# Patient Record
Sex: FEMALE | Race: WHITE | Employment: OTHER | URBAN - NONMETROPOLITAN AREA
[De-identification: names, ages, dates, MRNs, and addresses within clinical notes are randomized per-mention and may not be internally consistent; named-entity substitution may affect disease eponyms.]

---

## 2017-09-19 ENCOUNTER — HOSPITAL ENCOUNTER (OUTPATIENT)
Dept: NON INVASIVE DIAGNOSTICS | Age: 82
Discharge: HOME | End: 2017-09-19
Payer: MEDICARE

## 2017-09-19 LAB
LV EF: 55 %
LVEF MODALITY: NORMAL

## 2017-09-19 PROCEDURE — 93225 XTRNL ECG REC<48 HRS REC: CPT

## 2017-09-19 PROCEDURE — 93306 TTE W/DOPPLER COMPLETE: CPT

## 2018-06-19 ENCOUNTER — HOSPITAL ENCOUNTER (OUTPATIENT)
Dept: GENERAL RADIOLOGY | Age: 83
Discharge: HOME | End: 2018-06-21
Payer: MEDICARE

## 2018-06-19 ENCOUNTER — HOSPITAL ENCOUNTER (OUTPATIENT)
Age: 83
Discharge: HOME | End: 2018-06-21
Payer: MEDICARE

## 2018-06-19 DIAGNOSIS — M17.0 OSTEOARTHRITIS OF BOTH KNEES, UNSPECIFIED OSTEOARTHRITIS TYPE: ICD-10-CM

## 2018-06-19 PROCEDURE — 73564 X-RAY EXAM KNEE 4 OR MORE: CPT

## 2020-07-17 ENCOUNTER — APPOINTMENT (OUTPATIENT)
Dept: GENERAL RADIOLOGY | Age: 85
End: 2020-07-17
Payer: MEDICARE

## 2020-07-17 ENCOUNTER — HOSPITAL ENCOUNTER (EMERGENCY)
Age: 85
Discharge: HOME | End: 2020-07-17
Payer: MEDICARE

## 2020-07-17 VITALS
WEIGHT: 137 LBS | OXYGEN SATURATION: 97 % | RESPIRATION RATE: 18 BRPM | BODY MASS INDEX: 25.21 KG/M2 | HEART RATE: 66 BPM | HEIGHT: 62 IN | TEMPERATURE: 98.2 F | SYSTOLIC BLOOD PRESSURE: 144 MMHG | DIASTOLIC BLOOD PRESSURE: 64 MMHG

## 2020-07-17 LAB
ABSOLUTE EOS #: 0.36 K/UL (ref 0–0.44)
ABSOLUTE IMMATURE GRANULOCYTE: <0.03 K/UL (ref 0–0.3)
ABSOLUTE LYMPH #: 2.22 K/UL (ref 1.1–3.7)
ABSOLUTE MONO #: 0.59 K/UL (ref 0.1–1.2)
ANION GAP SERPL CALCULATED.3IONS-SCNC: 11 MMOL/L (ref 9–17)
BASOPHILS # BLD: 0 % (ref 0–2)
BASOPHILS ABSOLUTE: 0.03 K/UL (ref 0–0.2)
BNP INTERPRETATION: ABNORMAL
BUN BLDV-MCNC: 28 MG/DL (ref 8–23)
BUN/CREAT BLD: 18 (ref 9–20)
CALCIUM SERPL-MCNC: 8.9 MG/DL (ref 8.6–10.4)
CHLORIDE BLD-SCNC: 109 MMOL/L (ref 98–107)
CO2: 21 MMOL/L (ref 20–31)
CREAT SERPL-MCNC: 1.53 MG/DL (ref 0.5–0.9)
DIFFERENTIAL TYPE: ABNORMAL
EOSINOPHILS RELATIVE PERCENT: 5 % (ref 1–4)
GFR AFRICAN AMERICAN: 39 ML/MIN
GFR NON-AFRICAN AMERICAN: 32 ML/MIN
GFR SERPL CREATININE-BSD FRML MDRD: ABNORMAL ML/MIN/{1.73_M2}
GFR SERPL CREATININE-BSD FRML MDRD: ABNORMAL ML/MIN/{1.73_M2}
GLUCOSE BLD-MCNC: 133 MG/DL (ref 70–99)
HCT VFR BLD CALC: 40.4 % (ref 36.3–47.1)
HEMOGLOBIN: 13.1 G/DL (ref 11.9–15.1)
IMMATURE GRANULOCYTES: 0 %
LYMPHOCYTES # BLD: 28 % (ref 24–43)
MCH RBC QN AUTO: 31.7 PG (ref 25.2–33.5)
MCHC RBC AUTO-ENTMCNC: 32.4 G/DL (ref 28.4–34.8)
MCV RBC AUTO: 97.8 FL (ref 82.6–102.9)
MONOCYTES # BLD: 8 % (ref 3–12)
NRBC AUTOMATED: 0 PER 100 WBC
PDW BLD-RTO: 13.2 % (ref 11.8–14.4)
PLATELET # BLD: 220 K/UL (ref 138–453)
PLATELET ESTIMATE: ABNORMAL
PMV BLD AUTO: 10.8 FL (ref 8.1–13.5)
POTASSIUM SERPL-SCNC: 4.2 MMOL/L (ref 3.7–5.3)
PRO-BNP: 5432 PG/ML
RBC # BLD: 4.13 M/UL (ref 3.95–5.11)
RBC # BLD: ABNORMAL 10*6/UL
SEG NEUTROPHILS: 59 % (ref 36–65)
SEGMENTED NEUTROPHILS ABSOLUTE COUNT: 4.68 K/UL (ref 1.5–8.1)
SODIUM BLD-SCNC: 141 MMOL/L (ref 135–144)
TROPONIN INTERP: ABNORMAL
TROPONIN INTERP: ABNORMAL
TROPONIN T: ABNORMAL NG/ML
TROPONIN T: ABNORMAL NG/ML
TROPONIN, HIGH SENSITIVITY: 19 NG/L (ref 0–14)
TROPONIN, HIGH SENSITIVITY: 21 NG/L (ref 0–14)
WBC # BLD: 7.9 K/UL (ref 3.5–11.3)
WBC # BLD: ABNORMAL 10*3/UL

## 2020-07-17 PROCEDURE — 84484 ASSAY OF TROPONIN QUANT: CPT

## 2020-07-17 PROCEDURE — 80048 BASIC METABOLIC PNL TOTAL CA: CPT

## 2020-07-17 PROCEDURE — 93005 ELECTROCARDIOGRAM TRACING: CPT | Performed by: EMERGENCY MEDICINE

## 2020-07-17 PROCEDURE — 99285 EMERGENCY DEPT VISIT HI MDM: CPT

## 2020-07-17 PROCEDURE — 83880 ASSAY OF NATRIURETIC PEPTIDE: CPT

## 2020-07-17 PROCEDURE — 85025 COMPLETE CBC W/AUTO DIFF WBC: CPT

## 2020-07-17 PROCEDURE — 71045 X-RAY EXAM CHEST 1 VIEW: CPT

## 2020-07-17 PROCEDURE — 36415 COLL VENOUS BLD VENIPUNCTURE: CPT

## 2020-07-17 RX ORDER — FLUTICASONE PROPIONATE 50 MCG
1 SPRAY, SUSPENSION (ML) NASAL 2 TIMES DAILY
COMMUNITY

## 2020-07-17 RX ORDER — FUROSEMIDE 20 MG/1
20 TABLET ORAL 2 TIMES DAILY
Qty: 10 TABLET | Refills: 0 | Status: ON HOLD | OUTPATIENT
Start: 2020-07-17 | End: 2022-05-06

## 2020-07-17 NOTE — ED NOTES
Patient  updated via telephone on patient's care at this time in ED.       Miguel Paz RN  07/17/20 8493

## 2020-07-17 NOTE — ED NOTES
Patient ambulated to bathroom at this time and tolerated well. She verbalized understanding of need to use call light when finished.       Shahnaz Almaraz RN  07/17/20 3448

## 2020-07-17 NOTE — ED PROVIDER NOTES
[facility] ED  EMERGENCY DEPARTMENT ENCOUNTER      Pt Name: Velma Alvarez  MRN: [id number]  [place] 9/3/1928  Date of evaluation: 7/17/2020  Provider: Keyla Valenzuela PA-C    CHIEF COMPLAINT       Chief Complaint   Patient presents with    Shortness of Breath     Onset 0300,        HISTORY OF [place] is a 80 y.o. female who presents to the emergency department from home after being recommended that she come in by her family doctor. She states that 3 AM this morning she woke had to go urinate and experienced 90 second episode of shortness of breath. She states this quickly resolved but today she is felt a little short of breath throughout the day but not exertional and no episodes of chest pain or palpitation no pain or swelling of extremities. Denies fevers chills or cough. She spoke to her family doctor and told him this history and they recommended she come to the ER for evaluation. She is asymptomatic at this time. Triage notes and Nursing notes were reviewed by myself. Any discrepancies are addressed above.     PAST MEDICAL HISTORY     Past Medical History:   Diagnosis Date    Arthritis     Asthma     Atrial fibrillation ([address]     Atrial fibrillation ([address]     CAD (coronary artery disease)     Cerebral artery occlusion with cerebral infarction ([address]     Fibromyalgia     Hyperlipidemia     Hypertension     Thyroid disease        SURGICAL HISTORY       Past Surgical History:   Procedure Laterality Date    APPENDECTOMY      CHOLECYSTECTOMY      HYSTERECTOMY      OTHER SURGICAL HISTORY Left 5/26/15    lumbar OZZIE    TONSILLECTOMY         CURRENT MEDICATIONS       Discharge Medication List as of 7/17/2020  5:11 PM      CONTINUE these medications which have NOT CHANGED    Details   fluticasone (FLONASE) 50 MCG/ACT nasal spray 1 spray by Each Nostril route 2 times dailyHistorical Med      metoprolol (TOPROL-XL) 100 MG XL tablet Take 100 mg by No organomegaly or masses noted. No pulsatile masses noted. Skin free of any obvious rashes or lesions. Extremities without edema. No calf tenderness noted. Distal pulses and sensation intact. Good capillary refill noted. No acute neurologic deficit noted. Good gait and balance. Clear speech. Good affect. Pleasant patient. DIAGNOSTIC RESULTS     EKG:(none if blank)  All [facility]'s [facility] who either signs or Co-signs this chart in the absence of a cardiologist.  EKG Interpretation  Interpreted by emergency department physician  Rhythm: atrial fibrillation - controlled  Rate: 61  Axis: left  Ectopy: premature ventricular contractions (infrequent)  Conduction: normal  ST Segments: nonspecific changes  T Waves: non specific changes  Q Waves: nonspecific    Clinical Impression: non-specific EKG and atrial fibrillation (chronic)  [facility]      RADIOLOGY: (none if blank)   Interpretation per the Radiologistbelow, if available at the time of this note:    XR CHEST PORTABLE   Final Result   Mild pulmonary vascular congestion with slightly prominent cardiac   silhouette. Questionable tiny bilateral pleural effusions.              LABS:  Labs Reviewed   BASIC METABOLIC PANEL - Abnormal; Notable for the following components:       Result Value    Glucose 133 (*)     BUN 28 (*)     CREATININE 1.53 (*)     Chloride 109 (*)     GFR Non- 32 (*)     GFR  39 (*)     All other components within normal limits   BRAIN NATRIURETIC PEPTIDE - Abnormal; Notable for the following components:    Pro-BNP 5,432 (*)     All other components within normal limits   CBC WITH AUTO DIFFERENTIAL - Abnormal; Notable for the following components:    Eosinophils % 5 (*)     All other components within normal limits   TROPONIN - Abnormal; Notable for the following components:    Troponin, High Sensitivity 21 (*)     All other components within normal limits TROPONIN - Abnormal; Notable for the following components:    Troponin, High Sensitivity 19 (*)     All other components within normal limits       All other labs were within normal range or not returned as of this dictation. EMERGENCY DEPARTMENT COURSE andMedical Decision Making:     Vitals:    Vitals:    07/17/20 1546 07/17/20 1601 07/17/20 1616 07/17/20 1631   BP: (!) 150/74 (!) 148/69 (!) 160/66 (!) 144/64   Pulse: 73 70 63 66   Resp:       Temp:       SpO2: 97% 97% 96% 97%   Weight:       Height:           MDM/     Findings consistent with mild congestive heart failure no sign of respiratory difficulty patient is able ambulate back and forth to the bathroom without any difficulty and with no shortness of breath no sign of acute MI. We will place the patient on a short course of diuretics and have her follow with her family physician for recheck. She agrees to return should she develop any chest pain or increasing shortness of breath difficulty breathing or any problems. Strict return precautions and follow up instructions were discussed with the patient with which the patient agrees    ED Medications administered this visit:  Medications - No data to display    CONSULTS: (None if blank)  None    Procedures: (None if blank)       CLINICAL       1.  Acute on chronic congestive heart failure, unspecified heart failure type [facility])          DISPOSITION/PLAN   DISPOSITION Decision To Discharge 07/17/2020 05:10:31 PM      PATIENT REFERRED TO:  Christopher Salvador MD  [address] [place] [address]  [phone]    In 4 days        DISCHARGE MEDICATIONS:  Discharge Medication List as of 7/17/2020  5:11 PM      START taking these medications    Details   furosemide (LASIX) 20 MG tablet Take 1 tablet by mouth 2 times daily for 5 days, Disp-10 tablet,R-0Print                    (Please note that portions of this note were completed with a voice recognition program.  Efforts were made to edit the dictations but occasionallywords are mis-transcribed.)      Zulay Moore II, PA-C (electronically signed)           Zulay Moore II, PA-C  07/17/20 6669

## 2020-07-18 ENCOUNTER — CARE COORDINATION (OUTPATIENT)
Dept: CARE COORDINATION | Age: 85
End: 2020-07-18

## 2020-07-18 LAB
EKG ATRIAL RATE: 44 BPM
EKG Q-T INTERVAL: 396 MS
EKG QRS DURATION: 84 MS
EKG QTC CALCULATION (BAZETT): 398 MS
EKG R AXIS: -42 DEGREES
EKG T AXIS: -51 DEGREES
EKG VENTRICULAR RATE: 61 BPM

## 2020-07-18 PROCEDURE — 93010 ELECTROCARDIOGRAM REPORT: CPT | Performed by: INTERNAL MEDICINE

## 2020-07-18 NOTE — CARE COORDINATION
Patient contacted regarding recent visit for viral symptoms. This [facility] Doc contacted the patient by telephone to perform post discharge call. Verified name and  with patient as identifiers. Provided introduction to self, and reason for call due to viral symptoms of infection and/or exposure to COVID-19. Patient presented to emergency department/flu clinic with complaints of viral symptoms/exposure to COVID. Patient reports symptoms are improving. Due to no new or worsening symptoms the RN CTN/ACM was not notified for escalation. Discussed exposure protocols and quarantine with CDC Guidelines What To Do If You Are Sick    Patient was given an opportunity for questions and concerns. Stay home except to get medical care    Separate yourself from other people and animals in your home    Call ahead before visiting your doctor    Wear a facemask    Cover your coughs and sneezes    Clean your hands often    Avoid sharing personal household items    Clean all high-touch surfaces everyday    Monitor your symptoms  Seek prompt medical attention if your illness is worsening (e.g., difficulty breathing). Before seeking care, call your healthcare provider and tell them that you have, or are being evaluated for, COVID-19. Put on a facemask before you enter the facility. These steps will help the healthcare provider's office to keep other people in the office or waiting room from getting infected or exposed. Ask your healthcare provider to call the local or [facility] health department. Persons who are placed under If you have a medical emergency and need to call 911, notify the dispatch personnel that you have, or are being evaluated for COVID-19. If possible, put on a facemask before emergency medical services arrive. The patient agrees to contact the [facility] exposure line [phone], local health department [place] [facility]: ([phone]) and PCP office for questions related to their healthcare. Author provided contact information for future reference. Patient/family/caregiver given information for [employer] and agrees to enroll no  Patient's preferred e-mail:    Patient's preferred phone number:   Based on Loop alert triggers, patient will be contacted by nurse care manager for worsening symptoms. [place] reports that she is feeling better today. [place] reports that she is not nearly as SOB as she was yesterday. [place] reports that she still does not have a lot of output but did not drink a lot yesterday. [place] denied wanting to speak with a RN. [place] denied loop and reports that she will follow up with PCP Monday. [place] confirmed that her spouse, Susanne Gallegos is her medical decision maker followed by her son, Bonnell Apley who lives with them.

## 2020-07-28 ENCOUNTER — CARE COORDINATION (OUTPATIENT)
Dept: CARE COORDINATION | Age: 85
End: 2020-07-28

## 2020-07-28 NOTE — CARE COORDINATION
Patient contacted regarding COVID-19 risk and screening. This author contacted the patient by telephone to perform follow-up call. Verified name and  with patient as identifiers. Symptoms reviewed with patient. Patient reports symptoms are improving. Due to no new or worsening symptoms the RN CTN/ACM was not notified for escalation. This author reviewed discharge instructions, medical action plan and red flags such as increased shortness of breath, increasing fever, worsening cough or chest pain with patient who verbalized understanding. Discussed exposure protocols and quarantine with CDC Guidelines What To Do If You Are Sick    Patient who was given an opportunity for questions and concerns. The patient agrees to contact the [facility] exposure line [phone], local [facility] department [place] [facility]: ([phone])[facility] PCP office for questions related to their healthcare. Author provided contact information for future reference. [place] reports that she is doing better. Denied chest pain. [place] reports that she did attempt to follow up with Dr. Nicolle Ha however he was on vacation. Agreeable to call Dr. Cheri Phipps office again today.

## 2020-08-01 ENCOUNTER — CARE COORDINATION (OUTPATIENT)
Dept: CARE COORDINATION | Age: 85
End: 2020-08-01

## 2020-08-11 ENCOUNTER — HOSPITAL ENCOUNTER (OUTPATIENT)
Dept: NON INVASIVE DIAGNOSTICS | Age: 85
Discharge: HOME | End: 2020-08-11
Payer: MEDICARE

## 2020-08-11 LAB
LV EF: 55 %
LVEF MODALITY: NORMAL

## 2020-08-11 PROCEDURE — 93306 TTE W/DOPPLER COMPLETE: CPT

## 2021-11-16 ENCOUNTER — HOSPITAL ENCOUNTER (EMERGENCY)
Age: 86
Discharge: HOME | End: 2021-11-16
Payer: MEDICARE

## 2021-11-16 ENCOUNTER — APPOINTMENT (OUTPATIENT)
Dept: GENERAL RADIOLOGY | Age: 86
End: 2021-11-16
Payer: MEDICARE

## 2021-11-16 VITALS
WEIGHT: 135 LBS | BODY MASS INDEX: 25.49 KG/M2 | RESPIRATION RATE: 22 BRPM | SYSTOLIC BLOOD PRESSURE: 177 MMHG | OXYGEN SATURATION: 96 % | HEART RATE: 73 BPM | HEIGHT: 61 IN | TEMPERATURE: 97.4 F | DIASTOLIC BLOOD PRESSURE: 64 MMHG

## 2021-11-16 DIAGNOSIS — R53.1 GENERAL WEAKNESS: Primary | ICD-10-CM

## 2021-11-16 LAB
-: NORMAL
ABSOLUTE EOS #: 0.33 K/UL (ref 0–0.44)
ABSOLUTE IMMATURE GRANULOCYTE: <0.03 K/UL (ref 0–0.3)
ABSOLUTE LYMPH #: 2.13 K/UL (ref 1.1–3.7)
ABSOLUTE MONO #: 0.56 K/UL (ref 0.1–1.2)
AMORPHOUS: NORMAL
ANION GAP SERPL CALCULATED.3IONS-SCNC: 12 MMOL/L (ref 9–17)
BACTERIA: NORMAL
BASOPHILS # BLD: 1 % (ref 0–2)
BASOPHILS ABSOLUTE: 0.03 K/UL (ref 0–0.2)
BILIRUBIN URINE: NEGATIVE
BNP INTERPRETATION: ABNORMAL
BUN BLDV-MCNC: 22 MG/DL (ref 8–23)
BUN/CREAT BLD: 19 (ref 9–20)
CALCIUM SERPL-MCNC: 9.1 MG/DL (ref 8.6–10.4)
CASTS UA: NORMAL /LPF
CHLORIDE BLD-SCNC: 103 MMOL/L (ref 98–107)
CO2: 25 MMOL/L (ref 20–31)
COLOR: YELLOW
COMMENT UA: NORMAL
CREAT SERPL-MCNC: 1.14 MG/DL (ref 0.5–0.9)
CRYSTALS, UA: NORMAL /HPF
DIFFERENTIAL TYPE: ABNORMAL
EOSINOPHILS RELATIVE PERCENT: 5 % (ref 1–4)
EPITHELIAL CELLS UA: NORMAL /HPF (ref 0–25)
GFR AFRICAN AMERICAN: 54 ML/MIN
GFR NON-AFRICAN AMERICAN: 44 ML/MIN
GFR SERPL CREATININE-BSD FRML MDRD: ABNORMAL ML/MIN/{1.73_M2}
GFR SERPL CREATININE-BSD FRML MDRD: ABNORMAL ML/MIN/{1.73_M2}
GLUCOSE BLD-MCNC: 78 MG/DL (ref 70–99)
GLUCOSE URINE: NEGATIVE
HCT VFR BLD CALC: 46 % (ref 36.3–47.1)
HEMOGLOBIN: 15.2 G/DL (ref 11.9–15.1)
IMMATURE GRANULOCYTES: 0 %
KETONES, URINE: NEGATIVE
LEUKOCYTE ESTERASE, URINE: NEGATIVE
LYMPHOCYTES # BLD: 32 % (ref 24–43)
MCH RBC QN AUTO: 30.8 PG (ref 25.2–33.5)
MCHC RBC AUTO-ENTMCNC: 33 G/DL (ref 28.4–34.8)
MCV RBC AUTO: 93.1 FL (ref 82.6–102.9)
MONOCYTES # BLD: 9 % (ref 3–12)
MUCUS: NORMAL
NITRITE, URINE: NEGATIVE
NRBC AUTOMATED: 0 PER 100 WBC
OTHER OBSERVATIONS UA: NORMAL
PDW BLD-RTO: 14.1 % (ref 11.8–14.4)
PH UA: 6 (ref 5–9)
PLATELET # BLD: 219 K/UL (ref 138–453)
PLATELET ESTIMATE: ABNORMAL
PMV BLD AUTO: 10.8 FL (ref 8.1–13.5)
POTASSIUM SERPL-SCNC: 3.8 MMOL/L (ref 3.7–5.3)
PRO-BNP: 2292 PG/ML
PROTEIN UA: NEGATIVE
RBC # BLD: 4.94 M/UL (ref 3.95–5.11)
RBC # BLD: ABNORMAL 10*6/UL
RBC UA: NORMAL /HPF (ref 0–2)
RENAL EPITHELIAL, UA: NORMAL /HPF
SARS-COV-2, RAPID: NOT DETECTED
SEG NEUTROPHILS: 53 % (ref 36–65)
SEGMENTED NEUTROPHILS ABSOLUTE COUNT: 3.52 K/UL (ref 1.5–8.1)
SODIUM BLD-SCNC: 140 MMOL/L (ref 135–144)
SPECIFIC GRAVITY UA: 1.01 (ref 1.01–1.02)
SPECIMEN DESCRIPTION: NORMAL
TRICHOMONAS: NORMAL
TROPONIN INTERP: ABNORMAL
TROPONIN INTERP: ABNORMAL
TROPONIN T: ABNORMAL NG/ML
TROPONIN T: ABNORMAL NG/ML
TROPONIN, HIGH SENSITIVITY: 16 NG/L (ref 0–14)
TROPONIN, HIGH SENSITIVITY: 17 NG/L (ref 0–14)
TURBIDITY: CLEAR
URINE HGB: NEGATIVE
UROBILINOGEN, URINE: NORMAL
WBC # BLD: 6.6 K/UL (ref 3.5–11.3)
WBC # BLD: ABNORMAL 10*3/UL
WBC UA: NORMAL /HPF (ref 0–5)
YEAST: NORMAL

## 2021-11-16 PROCEDURE — 6360000002 HC RX W HCPCS: Performed by: PHYSICIAN ASSISTANT

## 2021-11-16 PROCEDURE — 84484 ASSAY OF TROPONIN QUANT: CPT

## 2021-11-16 PROCEDURE — 93005 ELECTROCARDIOGRAM TRACING: CPT | Performed by: EMERGENCY MEDICINE

## 2021-11-16 PROCEDURE — 96374 THER/PROPH/DIAG INJ IV PUSH: CPT

## 2021-11-16 PROCEDURE — 99282 EMERGENCY DEPT VISIT SF MDM: CPT

## 2021-11-16 PROCEDURE — C9803 HOPD COVID-19 SPEC COLLECT: HCPCS

## 2021-11-16 PROCEDURE — 80048 BASIC METABOLIC PNL TOTAL CA: CPT

## 2021-11-16 PROCEDURE — 85025 COMPLETE CBC W/AUTO DIFF WBC: CPT

## 2021-11-16 PROCEDURE — 81001 URINALYSIS AUTO W/SCOPE: CPT

## 2021-11-16 PROCEDURE — 87635 SARS-COV-2 COVID-19 AMP PRB: CPT

## 2021-11-16 PROCEDURE — 83880 ASSAY OF NATRIURETIC PEPTIDE: CPT

## 2021-11-16 PROCEDURE — 71045 X-RAY EXAM CHEST 1 VIEW: CPT

## 2021-11-16 RX ORDER — FUROSEMIDE 10 MG/ML
40 INJECTION INTRAMUSCULAR; INTRAVENOUS ONCE
Status: COMPLETED | OUTPATIENT
Start: 2021-11-16 | End: 2021-11-16

## 2021-11-16 RX ADMIN — FUROSEMIDE 40 MG: 10 INJECTION INTRAMUSCULAR; INTRAVENOUS at 12:39

## 2021-11-16 ASSESSMENT — ENCOUNTER SYMPTOMS
EYES NEGATIVE: 1
RESPIRATORY NEGATIVE: 1
GASTROINTESTINAL NEGATIVE: 1

## 2021-11-16 NOTE — ED NOTES
Dr Julia Gtz called at office.  He is with a patient and will call us back     Breana Regalado  11/16/21 0427

## 2021-11-16 NOTE — ED PROVIDER NOTES
[facility] ED  EMERGENCY DEPARTMENT ENCOUNTER      Pt Name: Jose Barrett  MRN: [id number]  [place] 9/3/1928  Date of evaluation: 11/16/2021  Provider: GENEVIEVE Jacobo PA-C    CHIEF COMPLAINT     Chief Complaint   Patient presents with    Fatigue     Onset 1 week ago    Shortness of Breath     Onset 1 week ago, history of CHF, Asthma. HISTORY OF PRESENT ILLNESS   (Location/Symptom, Timing/Onset, Context/Setting,Quality, Duration, Modifying Factors, Severity)  Note limiting factors. Jose Barrett is a80 y.o. female who presents to the emergency department      80year-old healthy female presented here with a chief complaint of weakness and shortness of breath. Family members are here at bedside and states she had some increased weakness over the weekend. She was seen evaluated by her primary care physician last week. Blood work at that time showed no acute abnormalities. Patient does have history of coronary artery disease, atrial fibrillation and MI in the past.  She denies any chest pain today. She states she has had some increased weakness and shortness of breath. Denies any known exposure to Covid. She has been vaccinated and had her booster. Nursing Notes werereviewed. REVIEW OF SYSTEMS    (2-9 systems for level 4, 10 or more for level 5)     Review of Systems   Constitutional: Negative. HENT: Negative. Eyes: Negative. Respiratory: Negative. Cardiovascular: Positive for leg swelling. Gastrointestinal: Negative. Endocrine: Negative. Genitourinary: Negative. Musculoskeletal: Negative. Neurological: Negative. Psychiatric/Behavioral: Negative. All other systems reviewed and are negative. Except as noted above the remainder of the review of systems was reviewed and negative.        PAST MEDICAL HISTORY     Past Medical History:   Diagnosis Date    Arthritis     Asthma     Atrial fibrillation ([facility] [address]     Atrial fibrillation ([facility] [address]  CAD (coronary artery disease)     Cerebral artery occlusion with cerebral infarction ([facility]     Fibromyalgia     Hyperlipidemia     Hypertension     Thyroid disease          SURGICALHISTORY       Past Surgical History:   Procedure Laterality Date    APPENDECTOMY      CHOLECYSTECTOMY      HYSTERECTOMY      OTHER SURGICAL HISTORY Left 5/26/15    lumbar OZZIE    TONSILLECTOMY           CURRENT MEDICATIONS       Previous Medications    ACETAMINOPHEN (TYLENOL) 500 MG TABLET    Take 500 mg by mouth every 6 hours as needed for Pain    ALBUTEROL (PROVENTIL HFA;VENTOLIN HFA) 108 (90 BASE) MCG/ACT INHALER    Inhale 2 puffs into the lungs every 6 hours as needed for Wheezing    AMLODIPINE (NORVASC) 5 MG TABLET    Take 5 mg by mouth daily    APIXABAN (ELIQUIS) 2.5 MG TABS TABLET    Take by mouth 2 times daily    ATORVASTATIN (LIPITOR) 20 MG TABLET    Take 20 mg by mouth nightly     CETIRIZINE (ZYRTEC) 10 MG TABLET    Take 10 mg by mouth daily    DICLOFENAC SODIUM 1 % GEL    Apply 2 g topically 2 times daily    DOFETILIDE (TIKOSYN) 125 MCG CAPSULE    Take 125 mcg by mouth 2 times daily    DONEPEZIL (ARICEPT) 5 MG TABLET    Take 5 mg by mouth nightly    FAMOTIDINE (PEPCID) 40 MG TABLET    Take 40 mg by mouth daily    FLUTICASONE (FLONASE) 50 MCG/ACT NASAL SPRAY    1 spray by Each Nostril route 2 times daily    FUROSEMIDE (LASIX) 20 MG TABLET    Take 1 tablet by mouth 2 times daily for 5 days    GABAPENTIN (NEURONTIN) 100 MG CAPSULE    Take 200 mg by mouth nightly    LEVOTHYROXINE (SYNTHROID) 112 MCG TABLET    Take 112 mcg by mouth Daily    LOSARTAN (COZAAR) 50 MG TABLET    Take 50 mg by mouth daily    MELATONIN 3 MG TABS TABLET    Take 3 mg by mouth daily    METOPROLOL (TOPROL-XL) 100 MG XL TABLET    Take 100 mg by mouth daily    OMEPRAZOLE (PRILOSEC) 20 MG CAPSULE    Take 40 mg by mouth daily    PREDNISONE (DELTASONE) 20 MG TABLET    Take 20 mg by mouth daily    TRAMADOL (ULTRAM) 50 MG TABLET    Take 50 mg by mouth every 12 hours as needed for Pain         ALLERGIES   Ambien [zolpidem tartrate]    FAMILY HISTORY     No family history on file. SOCIAL HISTORY       Social History     Socioeconomic History    Marital status:      Spouse name: Not on file    Number of children: Not on file    Years of education: Not on file    Highest education level: Not on file   Occupational History    Not on file   Tobacco Use    Smoking status: Never Smoker    Smokeless tobacco: Never Used   Substance and Sexual Activity    Alcohol use: Not Currently    Drug use: Never    Sexual activity: Not on file   Other Topics Concern    Not on file   Social History Narrative    Not on file     Social Determinants of Health     Financial Resource Strain:     Difficulty of Paying Living Expenses: Not on file   Food Insecurity:     Worried About Running Out of Food in the Last Year: Not on file    Jamie of Food in the Last Year: Not on file   Transportation Needs:     Lack of Transportation (Medical): Not on file    Lack of Transportation (Non-Medical):  Not on file   Physical Activity:     Days of Exercise per Week: Not on file    Minutes of Exercise per Session: Not on file   Stress:     Feeling of Stress : Not on file   Social Connections:     Frequency of Communication with Friends and Family: Not on file    Frequency of Social Gatherings with Friends and Family: Not on file    Attends Zoroastrian Services: Not on file    Active Member of [address] [place] [address] or Organizations: Not on file    Attends Club or Organization Meetings: Not on file    Marital Status: Not on file   Intimate Partner Violence:     Fear of Current or Ex-Partner: Not on file    Emotionally Abused: Not on file    Physically Abused: Not on file    Sexually Abused: Not on file   Housing Stability:     Unable to Pay for Housing in the Last Year: Not on file    Number of [place] in the Last Year: Not on file    Unstable Housing in the Last Year: Not emergency physician with the below findings:      Interpretationper the Radiologist below, if available at the time of this note:    XR CHEST PORTABLE   Final Result   No acute process. Stable cardiomegaly               ED BEDSIDE ULTRASOUND:   Performed by ED Physician - none    LABS:  Labs Reviewed   BASIC METABOLIC PANEL - Abnormal; Notable for the following components:       Result Value    CREATININE 1.14 (*)     GFR Non- 44 (*)     GFR  54 (*)     All other components within normal limits   BRAIN NATRIURETIC PEPTIDE - Abnormal; Notable for the following components:    Pro-BNP 2,292 (*)     All other components within normal limits   CBC WITH AUTO DIFFERENTIAL - Abnormal; Notable for the following components:    Hemoglobin 15.2 (*)     Eosinophils % 5 (*)     All other components within normal limits   TROPONIN - Abnormal; Notable for the following components:    Troponin, High Sensitivity 17 (*)     All other components within normal limits   TROPONIN - Abnormal; Notable for the following components:    Troponin, High Sensitivity 16 (*)     All other components within normal limits   COVID-19, RAPID   URINALYSIS WITH MICROSCOPIC       All other labs were within normal range or not returned as of this dictation. EMERGENCY DEPARTMENT COURSE and DIFFERENTIAL DIAGNOSIS/MDM:   Vitals:    Vitals:    11/16/21 1029 11/16/21 1038 11/16/21 1230   BP: (!) 170/67 (!) 190/77 (!) 206/67   Pulse: 56 65 82   Resp: 18 19 20   Temp: 97.4 °F (36.3 °C)     TempSrc: Tympanic     SpO2: 96% 97% 97%   Weight: 135 lb (61.2 kg)     Height: 5' 1\" (1.549 m)           MDM  Number of Diagnoses or Management Options  General weakness  Diagnosis management comments: Patient presented here with a chief complaint of weakness and shortness of breath. She does have a history of atrial fibrillation EKG showed a atrial fib rhythm here today no ectopy no ST elevation or depression.   Patient had a cardiac work-up here today. Showed mildly elevated troponin at 17 initially and repeat at 16. Patient denies any chest pain. BNP is mildly elevated at 2200. Patient was medicated with Lasix and also medicated for her blood pressure she has had several trips to the restroom here. She denies any chest pain or shortness of breath. Remaining blood work is unremarkable. I spoke to her primary care physician Evelin hRodes regarding her care. He agrees she can be discharged home. He is going to recommended start PT therapy at home for her. He states over the last week he has changed her prescription of Lipitor and Crestor. She had excellently started both medications at one time. Both of them were stopped on the 11th of this month. Patient is advised to use her walker at home. Patient can be discharged to home diagnosis of weakness. She will have close follow-up in the next 24 to 48 hours with Dr. Piyush Heredia. Patient and family members made aware of results and agree with plan of care. Patient was able to eat here and had no difficulties. Procedures    FINAL IMPRESSION      1.  General weakness Stable       DISPOSITION/PLAN   DISPOSITION        PATIENT REFERRED TO:  Danile Brady MD  [address] [place] [id number] [phone]    Schedule an appointment as soon as possible for a visit in 2 days  call for follow up appt thurs or fri of this week      DISCHARGE MEDICATIONS:  New Prescriptions    No medications on file              Summation      Patient Course:      ED Medications administered this visit:    Medications   furosemide (LASIX) injection 40 mg (40 mg IntraVENous Given 11/16/21 1239)       New Prescriptions from this visit:    New Prescriptions    No medications on file       Follow-up:  Daniel Brady MD  [address]  [phone]    Schedule an appointment as soon as possible for a visit in 2 days  call for follow up appt thurs or fri of this week        Final Impression:   1.  General weakness Stable              (Please note that portions of this note were completed with a voice recognition program.  Efforts were made to edit the dictations but occasionally words are mis-transcribed.)         Cinthya Roman PA-C  11/16/21 6524

## 2021-11-17 LAB
EKG ATRIAL RATE: 357 BPM
EKG Q-T INTERVAL: 446 MS
EKG QRS DURATION: 90 MS
EKG QTC CALCULATION (BAZETT): 390 MS
EKG R AXIS: -48 DEGREES
EKG T AXIS: 174 DEGREES
EKG VENTRICULAR RATE: 46 BPM

## 2021-11-17 PROCEDURE — 93010 ELECTROCARDIOGRAM REPORT: CPT | Performed by: INTERNAL MEDICINE

## 2022-02-07 ENCOUNTER — APPOINTMENT (OUTPATIENT)
Dept: GENERAL RADIOLOGY | Age: 87
End: 2022-02-07
Payer: MEDICARE

## 2022-02-07 ENCOUNTER — HOSPITAL ENCOUNTER (EMERGENCY)
Age: 87
Discharge: HOME | End: 2022-02-07
Attending: EMERGENCY MEDICINE
Payer: MEDICARE

## 2022-02-07 VITALS
OXYGEN SATURATION: 96 % | RESPIRATION RATE: 22 BRPM | DIASTOLIC BLOOD PRESSURE: 107 MMHG | BODY MASS INDEX: 25.89 KG/M2 | TEMPERATURE: 98.2 F | WEIGHT: 137 LBS | HEART RATE: 82 BPM | SYSTOLIC BLOOD PRESSURE: 165 MMHG

## 2022-02-07 DIAGNOSIS — I50.21 ACUTE SYSTOLIC CONGESTIVE HEART FAILURE (HCC): Primary | ICD-10-CM

## 2022-02-07 LAB
-: ABNORMAL
ABSOLUTE EOS #: 0.33 K/UL (ref 0–0.44)
ABSOLUTE IMMATURE GRANULOCYTE: <0.03 K/UL (ref 0–0.3)
ABSOLUTE LYMPH #: 1.32 K/UL (ref 1.1–3.7)
ABSOLUTE MONO #: 0.49 K/UL (ref 0.1–1.2)
ALBUMIN SERPL-MCNC: 3.8 G/DL (ref 3.5–5.2)
ALBUMIN/GLOBULIN RATIO: 1.1 (ref 1–2.5)
ALP BLD-CCNC: 127 U/L (ref 35–104)
ALT SERPL-CCNC: <5 U/L (ref 5–33)
AMORPHOUS: ABNORMAL
ANION GAP SERPL CALCULATED.3IONS-SCNC: 13 MMOL/L (ref 9–17)
AST SERPL-CCNC: 11 U/L
BACTERIA: ABNORMAL
BASOPHILS # BLD: 0 % (ref 0–2)
BASOPHILS ABSOLUTE: <0.03 K/UL (ref 0–0.2)
BILIRUB SERPL-MCNC: 0.47 MG/DL (ref 0.3–1.2)
BILIRUBIN URINE: NEGATIVE
BNP INTERPRETATION: ABNORMAL
BUN BLDV-MCNC: 17 MG/DL (ref 8–23)
BUN/CREAT BLD: 17 (ref 9–20)
CALCIUM SERPL-MCNC: 9.2 MG/DL (ref 8.6–10.4)
CASTS UA: ABNORMAL /LPF
CASTS UA: ABNORMAL /LPF
CHLORIDE BLD-SCNC: 106 MMOL/L (ref 98–107)
CO2: 19 MMOL/L (ref 20–31)
COLOR: YELLOW
COMMENT UA: ABNORMAL
CREAT SERPL-MCNC: 1.02 MG/DL (ref 0.5–0.9)
CRYSTALS, UA: ABNORMAL /HPF
DIFFERENTIAL TYPE: ABNORMAL
DIRECT EXAM: NORMAL
DIRECT EXAM: NORMAL
EKG ATRIAL RATE: 258 BPM
EKG Q-T INTERVAL: 370 MS
EKG QRS DURATION: 90 MS
EKG QTC CALCULATION (BAZETT): 464 MS
EKG R AXIS: -46 DEGREES
EKG T AXIS: 142 DEGREES
EKG VENTRICULAR RATE: 95 BPM
EOSINOPHILS RELATIVE PERCENT: 5 % (ref 1–4)
EPITHELIAL CELLS UA: ABNORMAL /HPF (ref 0–25)
GFR AFRICAN AMERICAN: >60 ML/MIN
GFR NON-AFRICAN AMERICAN: 51 ML/MIN
GFR SERPL CREATININE-BSD FRML MDRD: ABNORMAL ML/MIN/{1.73_M2}
GFR SERPL CREATININE-BSD FRML MDRD: ABNORMAL ML/MIN/{1.73_M2}
GLUCOSE BLD-MCNC: 147 MG/DL (ref 70–99)
GLUCOSE URINE: NEGATIVE
HCT VFR BLD CALC: 40.5 % (ref 36.3–47.1)
HEMOGLOBIN: 13.5 G/DL (ref 11.9–15.1)
IMMATURE GRANULOCYTES: 0 %
KETONES, URINE: NEGATIVE
LACTIC ACID, SEPSIS WHOLE BLOOD: NORMAL MMOL/L (ref 0.5–1.9)
LACTIC ACID, SEPSIS: 1.6 MMOL/L (ref 0.5–1.9)
LEUKOCYTE ESTERASE, URINE: NEGATIVE
LYMPHOCYTES # BLD: 18 % (ref 24–43)
Lab: NORMAL
Lab: NORMAL
MCH RBC QN AUTO: 31 PG (ref 25.2–33.5)
MCHC RBC AUTO-ENTMCNC: 33.3 G/DL (ref 28.4–34.8)
MCV RBC AUTO: 93.1 FL (ref 82.6–102.9)
MONOCYTES # BLD: 7 % (ref 3–12)
MUCUS: ABNORMAL
NITRITE, URINE: NEGATIVE
NRBC AUTOMATED: 0 PER 100 WBC
OTHER OBSERVATIONS UA: ABNORMAL
PDW BLD-RTO: 14.1 % (ref 11.8–14.4)
PH UA: 6 (ref 5–9)
PLATELET # BLD: 248 K/UL (ref 138–453)
PLATELET ESTIMATE: ABNORMAL
PMV BLD AUTO: 10.7 FL (ref 8.1–13.5)
POTASSIUM SERPL-SCNC: 3.6 MMOL/L (ref 3.7–5.3)
PRO-BNP: 4366 PG/ML
PROTEIN UA: ABNORMAL
RBC # BLD: 4.35 M/UL (ref 3.95–5.11)
RBC # BLD: ABNORMAL 10*6/UL
RBC UA: ABNORMAL /HPF (ref 0–2)
RENAL EPITHELIAL, UA: ABNORMAL /HPF
SARS-COV-2, RAPID: NOT DETECTED
SEG NEUTROPHILS: 70 % (ref 36–65)
SEGMENTED NEUTROPHILS ABSOLUTE COUNT: 5.08 K/UL (ref 1.5–8.1)
SODIUM BLD-SCNC: 138 MMOL/L (ref 135–144)
SPECIFIC GRAVITY UA: 1.01 (ref 1.01–1.02)
SPECIMEN DESCRIPTION: NORMAL
TOTAL PROTEIN: 7.2 G/DL (ref 6.4–8.3)
TRICHOMONAS: ABNORMAL
TROPONIN INTERP: ABNORMAL
TROPONIN INTERP: ABNORMAL
TROPONIN T: ABNORMAL NG/ML
TROPONIN T: ABNORMAL NG/ML
TROPONIN, HIGH SENSITIVITY: 17 NG/L (ref 0–14)
TROPONIN, HIGH SENSITIVITY: 19 NG/L (ref 0–14)
TSH SERPL DL<=0.05 MIU/L-ACNC: 1.34 MIU/L (ref 0.3–5)
TURBIDITY: CLEAR
URINE HGB: ABNORMAL
UROBILINOGEN, URINE: NORMAL
WBC # BLD: 7.3 K/UL (ref 3.5–11.3)
WBC # BLD: ABNORMAL 10*3/UL
WBC UA: ABNORMAL /HPF (ref 0–5)
YEAST: ABNORMAL

## 2022-02-07 PROCEDURE — 99284 EMERGENCY DEPT VISIT MOD MDM: CPT

## 2022-02-07 PROCEDURE — 80053 COMPREHEN METABOLIC PANEL: CPT

## 2022-02-07 PROCEDURE — 83880 ASSAY OF NATRIURETIC PEPTIDE: CPT

## 2022-02-07 PROCEDURE — 81001 URINALYSIS AUTO W/SCOPE: CPT

## 2022-02-07 PROCEDURE — 87040 BLOOD CULTURE FOR BACTERIA: CPT

## 2022-02-07 PROCEDURE — 93005 ELECTROCARDIOGRAM TRACING: CPT | Performed by: EMERGENCY MEDICINE

## 2022-02-07 PROCEDURE — 83605 ASSAY OF LACTIC ACID: CPT

## 2022-02-07 PROCEDURE — 36415 COLL VENOUS BLD VENIPUNCTURE: CPT

## 2022-02-07 PROCEDURE — 93010 ELECTROCARDIOGRAM REPORT: CPT | Performed by: INTERNAL MEDICINE

## 2022-02-07 PROCEDURE — 84484 ASSAY OF TROPONIN QUANT: CPT

## 2022-02-07 PROCEDURE — 96374 THER/PROPH/DIAG INJ IV PUSH: CPT

## 2022-02-07 PROCEDURE — 71045 X-RAY EXAM CHEST 1 VIEW: CPT

## 2022-02-07 PROCEDURE — 6370000000 HC RX 637 (ALT 250 FOR IP)

## 2022-02-07 PROCEDURE — 87635 SARS-COV-2 COVID-19 AMP PRB: CPT

## 2022-02-07 PROCEDURE — 85025 COMPLETE CBC W/AUTO DIFF WBC: CPT

## 2022-02-07 PROCEDURE — 87880 STREP A ASSAY W/OPTIC: CPT

## 2022-02-07 PROCEDURE — 6360000002 HC RX W HCPCS: Performed by: NURSE PRACTITIONER

## 2022-02-07 PROCEDURE — 87804 INFLUENZA ASSAY W/OPTIC: CPT

## 2022-02-07 PROCEDURE — C9803 HOPD COVID-19 SPEC COLLECT: HCPCS

## 2022-02-07 PROCEDURE — 84443 ASSAY THYROID STIM HORMONE: CPT

## 2022-02-07 RX ORDER — POTASSIUM CHLORIDE 20 MEQ/1
TABLET, EXTENDED RELEASE ORAL
Status: COMPLETED
Start: 2022-02-07 | End: 2022-02-07

## 2022-02-07 RX ORDER — SERTRALINE HYDROCHLORIDE 25 MG/1
25 TABLET, FILM COATED ORAL DAILY
COMMUNITY

## 2022-02-07 RX ORDER — POTASSIUM CHLORIDE 20 MEQ/1
20 TABLET, EXTENDED RELEASE ORAL 2 TIMES DAILY
Status: DISCONTINUED | OUTPATIENT
Start: 2022-02-07 | End: 2022-02-07 | Stop reason: HOSPADM

## 2022-02-07 RX ORDER — FUROSEMIDE 10 MG/ML
20 INJECTION INTRAMUSCULAR; INTRAVENOUS ONCE
Status: COMPLETED | OUTPATIENT
Start: 2022-02-07 | End: 2022-02-07

## 2022-02-07 RX ADMIN — POTASSIUM CHLORIDE 20 MEQ: 20 TABLET, EXTENDED RELEASE ORAL at 15:58

## 2022-02-07 RX ADMIN — POTASSIUM CHLORIDE 20 MEQ: 1500 TABLET, EXTENDED RELEASE ORAL at 15:58

## 2022-02-07 RX ADMIN — FUROSEMIDE 20 MG: 10 INJECTION, SOLUTION INTRAVENOUS at 15:56

## 2022-02-07 NOTE — ED PROVIDER NOTES
[facility] ED  EMERGENCY DEPARTMENT ENCOUNTER      Pt Name: Maria Ines Salazar  MRN: [id number]  [place] 9/3/1928  Date of evaluation: 2/7/2022  Provider: SHELLIE Wallace [id number]       Chief Complaint   Patient presents with    Fever     102 this am    Fatigue     increasing over the last 2-3 weeks    Other     Patient states she noticed a lump near her collarbone that was not previously there, onset Saturday    Chest Pain     Patinet reports 15 minute episode of chest pain that occured Friday          HISTORY OF PRESENT ILLNESS   (Location/Symptom, Timing/Onset, Context/Setting, Quality, Duration, Modifying Factors, Severity)  Note limiting factors. Maria Ines Salazar is a 80 y.o. female with past medical history significant for asthma, A. fib, coronary artery disease with MI, cerebral artery occlusion with infarction, fibromyalgia, hyperlipidemia, hypertension, thyroid disease presents to the emergency department with her son with complaints of 3-week history of fatigue and decreased energy and appetite. 3 to 4-day history of sore throat, mild cough, mild dyspnea. Patient notes brief episode of chest pain 3 days ago. She notes no difficulty swallowing. No current chest pain. No abdominal pain, vomiting, diarrhea or dysuria. HPI    Nursing Notes were reviewed. REVIEW OF SYSTEMS    (2-9 systems for level 4, 10 or more for level 5)     Review of Systems    Except as noted above the remainder of the review of systems was reviewed and negative.        PAST MEDICAL HISTORY     Past Medical History:   Diagnosis Date    Arthritis     Asthma     Atrial fibrillation ([address]     Atrial fibrillation ([address]     CAD (coronary artery disease)     Cerebral artery occlusion with cerebral infarction ([address]     Fibromyalgia     Hyperlipidemia     Hypertension     Thyroid disease          SURGICAL HISTORY       Past Surgical History:   Procedure Laterality Date    APPENDECTOMY      CHOLECYSTECTOMY      HYSTERECTOMY      OTHER SURGICAL HISTORY Left 5/26/15    lumbar OZZIE    TONSILLECTOMY           CURRENT MEDICATIONS       Discharge Medication List as of 2/7/2022  6:13 PM      CONTINUE these medications which have NOT CHANGED    Details   sertraline (ZOLOFT) 25 MG tablet Take 25 mg by mouth dailyHistorical Med      fluticasone (FLONASE) 50 MCG/ACT nasal spray 1 spray by Each Nostril route 2 times dailyHistorical Med      furosemide (LASIX) 20 MG tablet Take 1 tablet by mouth 2 times daily for 5 days, Disp-10 tablet, R-0Print      metoprolol (TOPROL-XL) 100 MG XL tablet Take 50 mg by mouth daily Historical Med      amLODIPine (NORVASC) 5 MG tablet Take 5 mg by mouth dailyHistorical Med      donepezil (ARICEPT) 5 MG tablet Take 5 mg by mouth nightlyHistorical Med      apixaban (ELIQUIS) 2.5 MG TABS tablet Take by mouth 2 times dailyHistorical Med      levothyroxine (SYNTHROID) 112 MCG tablet Take 100 mcg by mouth Daily Historical Med      losartan (COZAAR) 50 MG tablet Take 50 mg by mouth dailyHistorical Med      melatonin 3 MG TABS tablet Take 2 mg by mouth daily Historical Med      omeprazole (PRILOSEC) 20 MG capsule Take 40 mg by mouth dailyHistorical Med      famotidine (PEPCID) 40 MG tablet Take 40 mg by mouth dailyHistorical Med      albuterol (PROVENTIL HFA;VENTOLIN HFA) 108 (90 BASE) MCG/ACT inhaler Inhale 2 puffs into the lungs every 6 hours as needed for WheezingHistorical Med      acetaminophen (TYLENOL) 500 MG tablet Take 500 mg by mouth every 6 hours as needed for PainHistorical Med      diclofenac sodium 1 % GEL Apply 2 g topically 2 times daily, Topical, 2 TIMES DAILY, Historical Med             ALLERGIES     Ambien [zolpidem tartrate]    FAMILY HISTORY     History reviewed. No pertinent family history.        SOCIAL HISTORY       Social History     Socioeconomic History    Marital status:      Spouse name: None    Number of children: None    Years of education: None    Highest education level: None   Occupational History    None   Tobacco Use    Smoking status: Never Smoker    Smokeless tobacco: Never Used   Substance and Sexual Activity    Alcohol use: Not Currently    Drug use: Never    Sexual activity: None   Other Topics Concern    None   Social History Narrative    None     Social Determinants of Health     Financial Resource Strain:     Difficulty of Paying Living Expenses: Not on file   Food Insecurity:     Worried About Running Out of Food in the Last Year: Not on file    Jamie of Food in the Last Year: Not on file   Transportation Needs:     Lack of Transportation (Medical): Not on file    Lack of Transportation (Non-Medical):  Not on file   Physical Activity:     Days of Exercise per Week: Not on file    Minutes of Exercise per Session: Not on file   Stress:     Feeling of Stress : Not on file   Social Connections:     Frequency of Communication with Friends and Family: Not on file    Frequency of Social Gatherings with Friends and Family: Not on file    Attends Church Services: Not on file    Active Member of [address] [place] [id number] or Organizations: Not on file    Attends Club or Organization Meetings: Not on file    Marital Status: Not on file   Intimate Partner Violence:     Fear of Current or Ex-Partner: Not on file    Emotionally Abused: Not on file    Physically Abused: Not on file    Sexually Abused: Not on file   Housing Stability:     Unable to Pay for Housing in the Last Year: Not on file    Number of [place] in the Last Year: Not on file    Unstable Housing in the Last Year: Not on file       SCREENINGS                               CIWA Assessment  BP: (!) 165/107  Pulse: 82                 PHYSICAL EXAM    (up to 7 for level 4, 8 or more for level 5)     ED Triage Vitals [02/07/22 1427]   BP Temp Temp Source Pulse Resp SpO2 Height Weight   (!) 143/77 98.2 °F (36.8 °C) Oral 87 16 96 % -- 137 lb (62.1 kg)       Physical Exam     General: Well-developed, well-nourished, in no apparent distress. HEENT exam: Normocephalic, atraumatic. Pupils equal round and reactive to light and external ocular muscles intact. Posterior pharynx noninjected. Oral airway widely patent. No exudate present. Neck exam: Supple no lymphadenopathy, trachea midline. Chest exam: No audible wheezing. Mild increased respiratory effort with a respiratory rate of 22 and O2 sat of 96% on room air. Heart: Normal heart rate and rhythm. No murmur. Abdomen: Soft, nontender, nondistended. Back: No midline tenderness. No CVA tenderness. Extremities: Patient moving all extremities or difficulty. Intact distal pulses and sensation. Neurologic: Alert and oriented x3. Able to make informed decisions. Skin exam: Clean dry and intact. Blood pressure 143/77, temp 98.2 Fahrenheit, heart rate 87 slightly irregular. Respiratory rate 16 and unlabored and patient satting 96% on room air. Exam remarkable for an alertDIAGNOSTIC RESULTS     EKG: All EKG's are interpreted by the Emergency Department Physician who either signs or Co-signs this chart in the absence of a cardiologist.    EKG obtained, reviewed interpreted by myself revealing normal sinus rhythm with a ventricular rate of 95 bpm, QRS duration of 90 ms, corrected QTC 4 and 64 ms. No ST segment elevation or depression identified. RADIOLOGY:   Non-plain film images such as CT, Ultrasound and MRI are read by the radiologist. Plain radiographic images are visualized and preliminarily interpreted by the emergency physician with the below findings:      Interpretation per the Radiologist below, if available at the time of this note:    XR CHEST PORTABLE   Final Result   Stable cardiac silhouette enlargement. No convincing evidence of edema or pneumonia.                ED BEDSIDE ULTRASOUND:   Performed by ED Physician - none    LABS:  Labs Reviewed   URINE RT REFLEX TO CULTURE - Abnormal; Notable for the following components:       Result Value    Urine Hgb TRACE (*)     Protein, UA TRACE (*)     All other components within normal limits   CBC WITH AUTO DIFFERENTIAL - Abnormal; Notable for the following components:    Seg Neutrophils 70 (*)     Lymphocytes 18 (*)     Eosinophils % 5 (*)     All other components within normal limits   COMPREHENSIVE METABOLIC PANEL W/ REFLEX TO MG FOR LOW K - Abnormal; Notable for the following components:    Glucose 147 (*)     CREATININE 1.02 (*)     Potassium 3.6 (*)     CO2 19 (*)     Alkaline Phosphatase 127 (*)     ALT <5 (*)     GFR Non- 51 (*)     All other components within normal limits   TROPONIN - Abnormal; Notable for the following components:    Troponin, High Sensitivity 19 (*)     All other components within normal limits   BRAIN NATRIURETIC PEPTIDE - Abnormal; Notable for the following components:    Pro-BNP 4,366 (*)     All other components within normal limits   TROPONIN - Abnormal; Notable for the following components:    Troponin, High Sensitivity 17 (*)     All other components within normal limits   MICROSCOPIC URINALYSIS - Abnormal; Notable for the following components:    Bacteria, UA TRACE (*)     Mucus, UA TRACE (*)     All other components within normal limits   COVID-19, RAPID   RAPID INFLUENZA A/B ANTIGENS   CULTURE, BLOOD 1   CULTURE, BLOOD 2   STREP SCREEN GROUP A THROAT   LACTATE, SEPSIS   TSH WITH REFLEX       All other labs were within normal range or not returned as of this dictation.     EMERGENCY DEPARTMENT COURSE and DIFFERENTIAL DIAGNOSIS/MDM:   Vitals:    Vitals:    02/07/22 1430 02/07/22 1445 02/07/22 1500 02/07/22 1715   BP: (!) 143/77 129/79 (!) 135/106 (!) 165/107   Pulse:  91 103 82   Resp:  16 19 22   Temp:       TempSrc:       SpO2: 94% 95%  96%   Weight:               MEÑO Bailey is a 80 y.o. female with past medical history significant for asthma, A. fib, coronary artery disease with MI, cerebral artery occlusion with infarction, fibromyalgia, hyperlipidemia, hypertension, thyroid disease presents to the emergency department with her son with complaints of 3-week history of fatigue and decreased energy and appetite. 3 to 4-day history of sore throat, mild cough, mild dyspnea. Patient notes brief episode of chest pain 3 days ago. She notes no difficulty swallowing. No current chest pain. No abdominal pain, vomiting, diarrhea or dysuria. Exam remarkable for alert, interactive and hydrated appearing 80year-old female in no acute distress. She does appear mildly dyspneic on exam.  HEENT unremarkable. Patient has no audible wheezing. Mild increased respiratory effort. Abdomen is benign. She is moving all extremities no difficulty. Blood pressure 143/77, temp 98.2 Fahrenheit, heart rate 87 slightly irregular. Respiratory rate 16 and unlabored and patient satting 96% on room air. CBC unremarkable. ChemistriesRevealed a potassium of 3.6, CO2 of 19, creatinine 1.02, GFR 51.  proBNP 4366. Troponin XIX.,  Subsequent troponin XVII. Alk phos 127. TSH 1.34    Influenza and COVID-19 testing negative. Strep screen was negative. Urinalysis unremarkable. EKG unremarkable. Chest x-ray obtained, reviewed interpreted by myself without focal infiltrate. Patient and patient's son apprised of the imaging and laboratory findings. Patient was given Lasix p.o. My recommendations are that she be discharged home to follow-up closely with PCP for reevaluation early next week. I recommended that they call Monday for an appointment. Return for increased pain, fever, difficulty breathing, vomiting or new or worsening signs or symptoms. REASSESSMENT              CONSULTS:  None    PROCEDURES:  Unless otherwise noted below, none     Procedures      FINAL IMPRESSION      1.  Acute systolic congestive heart failure ([address] Inadequately Controlled         DISPOSITION/PLAN   DISPOSITION Decision To Discharge 02/07/2022 05:52:25 PM      PATIENT REFERRED TO:  Magdaleno Jones MD  [facility] [address] [place] [address] [id number]    In 2 days  for re-evaluation      DISCHARGE MEDICATIONS:  Discharge Medication List as of 2/7/2022  6:13 PM        Controlled Substances Monitoring:     No flowsheet data found.     (Please note that portions of this note were completed with a voice recognition program.  Efforts were made to edit the dictations but occasionally words are mis-transcribed.)    SHELLIE Olmos CNP (electronically signed)  Attending Emergency Physician           SHELLIE Olmos CNP  02/08/22 1112

## 2022-02-12 LAB
CULTURE: NORMAL
CULTURE: NORMAL
Lab: NORMAL
Lab: NORMAL
SPECIMEN DESCRIPTION: NORMAL
SPECIMEN DESCRIPTION: NORMAL

## 2022-03-02 PROBLEM — Z86.73 HISTORY OF STROKE: Status: ACTIVE | Noted: 2022-03-02

## 2022-03-02 PROBLEM — I25.10 CAD (CORONARY ARTERY DISEASE): Status: ACTIVE | Noted: 2022-03-02

## 2022-03-02 PROBLEM — E03.9 HYPOTHYROIDISM: Status: ACTIVE | Noted: 2022-03-02

## 2022-03-02 PROBLEM — E78.5 HYPERLIPIDEMIA: Status: ACTIVE | Noted: 2022-03-02

## 2022-03-02 PROBLEM — I10 HYPERTENSION: Status: ACTIVE | Noted: 2022-03-02

## 2022-03-02 PROBLEM — E78.5 HYPERLIPIDEMIA: Chronic | Status: ACTIVE | Noted: 2022-03-02

## 2022-03-02 PROBLEM — Z86.73 HISTORY OF STROKE: Chronic | Status: ACTIVE | Noted: 2022-03-02

## 2022-03-02 PROBLEM — I10 HYPERTENSION: Chronic | Status: ACTIVE | Noted: 2022-03-02

## 2022-03-02 PROBLEM — E03.9 HYPOTHYROIDISM: Chronic | Status: ACTIVE | Noted: 2022-03-02

## 2022-03-02 PROBLEM — I25.10 CAD (CORONARY ARTERY DISEASE): Chronic | Status: ACTIVE | Noted: 2022-03-02

## 2022-04-16 ENCOUNTER — HOSPITAL ENCOUNTER (EMERGENCY)
Age: 87
Discharge: ANOTHER HOSPITAL | End: 2022-04-16
Attending: EMERGENCY MEDICINE
Payer: MEDICARE

## 2022-04-16 ENCOUNTER — APPOINTMENT (OUTPATIENT)
Dept: GENERAL RADIOLOGY | Age: 87
End: 2022-04-16
Payer: MEDICARE

## 2022-04-16 VITALS
HEART RATE: 129 BPM | TEMPERATURE: 98.2 F | RESPIRATION RATE: 19 BRPM | SYSTOLIC BLOOD PRESSURE: 155 MMHG | DIASTOLIC BLOOD PRESSURE: 92 MMHG | OXYGEN SATURATION: 95 %

## 2022-04-16 DIAGNOSIS — R77.8 ELEVATED TROPONIN: ICD-10-CM

## 2022-04-16 DIAGNOSIS — I48.91 ATRIAL FIBRILLATION WITH RVR (HCC): ICD-10-CM

## 2022-04-16 DIAGNOSIS — I50.9 ACUTE ON CHRONIC CONGESTIVE HEART FAILURE, UNSPECIFIED HEART FAILURE TYPE (HCC): ICD-10-CM

## 2022-04-16 DIAGNOSIS — J96.00 ACUTE RESPIRATORY FAILURE, UNSPECIFIED WHETHER WITH HYPOXIA OR HYPERCAPNIA (HCC): Primary | ICD-10-CM

## 2022-04-16 LAB
ABSOLUTE EOS #: 0.12 K/UL (ref 0–0.44)
ABSOLUTE IMMATURE GRANULOCYTE: 0.04 K/UL (ref 0–0.3)
ABSOLUTE LYMPH #: 1.99 K/UL (ref 1.1–3.7)
ABSOLUTE MONO #: 0.68 K/UL (ref 0.1–1.2)
ANION GAP SERPL CALCULATED.3IONS-SCNC: 15 MMOL/L (ref 9–17)
BASOPHILS # BLD: 0 % (ref 0–2)
BASOPHILS ABSOLUTE: 0.04 K/UL (ref 0–0.2)
BUN BLDV-MCNC: 24 MG/DL (ref 8–23)
BUN/CREAT BLD: 22 (ref 9–20)
CALCIUM SERPL-MCNC: 8.8 MG/DL (ref 8.6–10.4)
CHLORIDE BLD-SCNC: 107 MMOL/L (ref 98–107)
CO2: 24 MMOL/L (ref 20–31)
CREAT SERPL-MCNC: 1.08 MG/DL (ref 0.5–0.9)
EKG ATRIAL RATE: 113 BPM
EKG Q-T INTERVAL: 348 MS
EKG QRS DURATION: 82 MS
EKG QTC CALCULATION (BAZETT): 483 MS
EKG R AXIS: -103 DEGREES
EKG T AXIS: 40 DEGREES
EKG VENTRICULAR RATE: 116 BPM
EOSINOPHILS RELATIVE PERCENT: 1 % (ref 1–4)
GFR AFRICAN AMERICAN: 57 ML/MIN
GFR NON-AFRICAN AMERICAN: 47 ML/MIN
GFR SERPL CREATININE-BSD FRML MDRD: ABNORMAL ML/MIN/{1.73_M2}
GFR SERPL CREATININE-BSD FRML MDRD: ABNORMAL ML/MIN/{1.73_M2}
GLUCOSE BLD-MCNC: 111 MG/DL (ref 70–99)
HCO3 VENOUS: 22.3 MMOL/L (ref 24–30)
HCT VFR BLD CALC: 44.1 % (ref 36.3–47.1)
HEMOGLOBIN: 14 G/DL (ref 11.9–15.1)
IMMATURE GRANULOCYTES: 0 %
LYMPHOCYTES # BLD: 18 % (ref 24–43)
MCH RBC QN AUTO: 30.4 PG (ref 25.2–33.5)
MCHC RBC AUTO-ENTMCNC: 31.7 G/DL (ref 28.4–34.8)
MCV RBC AUTO: 95.7 FL (ref 82.6–102.9)
MONOCYTES # BLD: 6 % (ref 3–12)
NEGATIVE BASE EXCESS, VEN: 1.1 MMOL/L (ref 0–2)
NRBC AUTOMATED: 0 PER 100 WBC
O2 DEVICE/FLOW/%: ABNORMAL
O2 SAT, VEN: 79.8 % (ref 60–85)
PATIENT TEMP: 37
PCO2, VEN: 33.8 (ref 39–55)
PDW BLD-RTO: 16.7 % (ref 11.8–14.4)
PH VENOUS: 7.44 (ref 7.32–7.42)
PLATELET # BLD: 202 K/UL (ref 138–453)
PMV BLD AUTO: 11.3 FL (ref 8.1–13.5)
PO2, VEN: 41.9 (ref 30–50)
POTASSIUM SERPL-SCNC: 3.5 MMOL/L (ref 3.7–5.3)
PRO-BNP: ABNORMAL PG/ML
RBC # BLD: 4.61 M/UL (ref 3.95–5.11)
RESPIRATORY RATE: 20
SARS-COV-2, RAPID: NOT DETECTED
SEG NEUTROPHILS: 75 % (ref 36–65)
SEGMENTED NEUTROPHILS ABSOLUTE COUNT: 8.15 K/UL (ref 1.5–8.1)
SODIUM BLD-SCNC: 146 MMOL/L (ref 135–144)
SPECIMEN DESCRIPTION: NORMAL
TROPONIN, HIGH SENSITIVITY: 295 NG/L (ref 0–14)
TROPONIN, HIGH SENSITIVITY: 371 NG/L (ref 0–14)
TSH SERPL DL<=0.05 MIU/L-ACNC: 1.96 UIU/ML (ref 0.3–5)
WBC # BLD: 11 K/UL (ref 3.5–11.3)

## 2022-04-16 PROCEDURE — 93005 ELECTROCARDIOGRAM TRACING: CPT | Performed by: EMERGENCY MEDICINE

## 2022-04-16 PROCEDURE — 96375 TX/PRO/DX INJ NEW DRUG ADDON: CPT

## 2022-04-16 PROCEDURE — 2500000003 HC RX 250 WO HCPCS: Performed by: EMERGENCY MEDICINE

## 2022-04-16 PROCEDURE — 84484 ASSAY OF TROPONIN QUANT: CPT

## 2022-04-16 PROCEDURE — 87635 SARS-COV-2 COVID-19 AMP PRB: CPT

## 2022-04-16 PROCEDURE — 80048 BASIC METABOLIC PNL TOTAL CA: CPT

## 2022-04-16 PROCEDURE — C9803 HOPD COVID-19 SPEC COLLECT: HCPCS

## 2022-04-16 PROCEDURE — 85025 COMPLETE CBC W/AUTO DIFF WBC: CPT

## 2022-04-16 PROCEDURE — 93010 ELECTROCARDIOGRAM REPORT: CPT | Performed by: INTERNAL MEDICINE

## 2022-04-16 PROCEDURE — 84443 ASSAY THYROID STIM HORMONE: CPT

## 2022-04-16 PROCEDURE — 36415 COLL VENOUS BLD VENIPUNCTURE: CPT

## 2022-04-16 PROCEDURE — 6360000002 HC RX W HCPCS: Performed by: EMERGENCY MEDICINE

## 2022-04-16 PROCEDURE — 94660 CPAP INITIATION&MGMT: CPT

## 2022-04-16 PROCEDURE — 71045 X-RAY EXAM CHEST 1 VIEW: CPT

## 2022-04-16 PROCEDURE — 96374 THER/PROPH/DIAG INJ IV PUSH: CPT

## 2022-04-16 PROCEDURE — 82805 BLOOD GASES W/O2 SATURATION: CPT

## 2022-04-16 PROCEDURE — 83880 ASSAY OF NATRIURETIC PEPTIDE: CPT

## 2022-04-16 PROCEDURE — 99284 EMERGENCY DEPT VISIT MOD MDM: CPT

## 2022-04-16 RX ORDER — METHYLPREDNISOLONE SODIUM SUCCINATE 125 MG/2ML
60 INJECTION, POWDER, LYOPHILIZED, FOR SOLUTION INTRAMUSCULAR; INTRAVENOUS ONCE
Status: COMPLETED | OUTPATIENT
Start: 2022-04-16 | End: 2022-04-16

## 2022-04-16 RX ORDER — FUROSEMIDE 10 MG/ML
20 INJECTION INTRAMUSCULAR; INTRAVENOUS ONCE
Status: COMPLETED | OUTPATIENT
Start: 2022-04-16 | End: 2022-04-16

## 2022-04-16 RX ORDER — DILTIAZEM HYDROCHLORIDE 5 MG/ML
10 INJECTION INTRAVENOUS ONCE
Status: COMPLETED | OUTPATIENT
Start: 2022-04-16 | End: 2022-04-16

## 2022-04-16 RX ADMIN — METHYLPREDNISOLONE SODIUM SUCCINATE 60 MG: 125 INJECTION, POWDER, FOR SOLUTION INTRAMUSCULAR; INTRAVENOUS at 09:43

## 2022-04-16 RX ADMIN — DILTIAZEM HYDROCHLORIDE 10 MG: 5 INJECTION INTRAVENOUS at 09:45

## 2022-04-16 RX ADMIN — FUROSEMIDE 20 MG: 10 INJECTION, SOLUTION INTRAVENOUS at 11:29

## 2022-04-16 ASSESSMENT — PAIN DESCRIPTION - LOCATION: LOCATION: CHEST

## 2022-04-16 ASSESSMENT — PAIN SCALES - GENERAL: PAINLEVEL_OUTOF10: 1

## 2022-04-16 NOTE — ED NOTES
Contacted radiology requesting them to send images to Cedric Bertrand.        Karthikeyan Jama  04/16/22 7991

## 2022-04-16 NOTE — ED NOTES
Critical trop of 295 reported to  and Rand Barker RN at this time face to face     Janes Zazueta RN  04/16/22 5635

## 2022-04-16 NOTE — ED NOTES
[facility] called back directly accepting the patient. We contacted [id number] them. Patient is waiting on bed placement.       John Hurtado  04/16/22 1045

## 2022-04-16 NOTE — ED NOTES
The Intensivist is in a surgery, will return Dr. Rafal Booker when finished.       Cathy Hendrickson  04/16/22 1035

## 2022-04-16 NOTE — ED NOTES
[address] for Dr. Maria Alejandra Valdez to speak to the [address] [place], [place] [address]e at [facility].       Olaf Pollard  04/16/22 3320 How Severe Is This Condition?: mild

## 2022-04-16 NOTE — ED NOTES
Report given to [facility] ICU.       Marshall Hill RN  04/16/22 5049

## 2022-04-16 NOTE — ED NOTES
[facility] called back with bed assignment. We contacted [employer] and they are setting up transportation.       Karthikeyan Jama  04/16/22 1129

## 2022-04-16 NOTE — ED PROVIDER NOTES
[facility]  EMERGENCY DEPARTMENT ENCOUNTER      Pt Name: Charlie Aguilar  MRN: [id number]  [place] 9/3/1928  Date of evaluation: 4/16/2022  Provider: Nancy Del Real MD    CHIEF COMPLAINT       Chief Complaint   Patient presents with    Chest Pain     chest pain with sob beginning last night around midnight to center chest. states feels better this morning.  Shortness of Breath         HISTORY OF PRESENT ILLNESS   (Location/Symptom, Timing/Onset, Context/Setting, Quality, Duration, Modifying Factors, Severity)  Note limiting factors. Charlie Aguilar is a 80 y.o. female who presents to the emergency department        81 yo female sent from [facility] for SOB. Symptoms started around 12:30 am.  Patient denied chest pain, just felt heaviness due to dyspnea. No fever or cough. Recent diagnosis of pneumonia. Nursing Notes were reviewed. REVIEW OF SYSTEMS    (2-9 systems for level 4, 10 or more for level 5)     Review of Systems   All other systems reviewed and are negative. Except as noted above the remainder of the review of systems was reviewed and negative.        PAST MEDICAL HISTORY     Past Medical History:   Diagnosis Date    Arthritis     Asthma     Atrial fibrillation ([address]     CAD (coronary artery disease)     Fibromyalgia     History of stroke     Hyperlipidemia     Hypertension     Hypothyroidism          SURGICAL HISTORY       Past Surgical History:   Procedure Laterality Date    APPENDECTOMY      CHOLECYSTECTOMY      HYSTERECTOMY      OTHER SURGICAL HISTORY Left 5/26/15    lumbar OZZIE    TONSILLECTOMY           CURRENT MEDICATIONS       Discharge Medication List as of 4/16/2022  2:49 PM      CONTINUE these medications which have NOT CHANGED    Details   sertraline (ZOLOFT) 25 MG tablet Take 25 mg by mouth dailyHistorical Med      fluticasone (FLONASE) 50 MCG/ACT nasal spray 1 spray by Each Nostril route 2 times dailyHistorical Med      furosemide (LASIX) 20 MG tablet Take 1 tablet by mouth 2 times daily for 5 days, Disp-10 tablet, R-0Print      metoprolol (TOPROL-XL) 100 MG XL tablet Take 50 mg by mouth daily Historical Med      amLODIPine (NORVASC) 5 MG tablet Take 5 mg by mouth dailyHistorical Med      donepezil (ARICEPT) 5 MG tablet Take 5 mg by mouth nightlyHistorical Med      apixaban (ELIQUIS) 2.5 MG TABS tablet Take by mouth 2 times dailyHistorical Med      levothyroxine (SYNTHROID) 112 MCG tablet Take 100 mcg by mouth Daily Historical Med      losartan (COZAAR) 50 MG tablet Take 50 mg by mouth dailyHistorical Med      melatonin 3 MG TABS tablet Take 2 mg by mouth daily Historical Med      omeprazole (PRILOSEC) 20 MG capsule Take 40 mg by mouth dailyHistorical Med      famotidine (PEPCID) 40 MG tablet Take 40 mg by mouth dailyHistorical Med      albuterol (PROVENTIL HFA;VENTOLIN HFA) 108 (90 BASE) MCG/ACT inhaler Inhale 2 puffs into the lungs every 6 hours as needed for WheezingHistorical Med      acetaminophen (TYLENOL) 500 MG tablet Take 500 mg by mouth every 6 hours as needed for PainHistorical Med      diclofenac sodium 1 % GEL Apply 2 g topically 2 times daily, Topical, 2 TIMES DAILY, Historical Med             ALLERGIES     Ambien [zolpidem tartrate], Amiodarone, Cymbalta [duloxetine hcl], Griseofulvin ultramicrosize [griseofulvin], and Lyrica [pregabalin]    FAMILY HISTORY     No family history on file.        SOCIAL HISTORY       Social History     Socioeconomic History    Marital status:      Spouse name: Not on file    Number of children: Not on file    Years of education: Not on file    Highest education level: Not on file   Occupational History    Not on file   Tobacco Use    Smoking status: Never Smoker    Smokeless tobacco: Never Used   Substance and Sexual Activity    Alcohol use: Not Currently    Drug use: Never    Sexual activity: Not on file   Other Topics Concern    Not on file   Social History Narrative    Not on file Social Determinants of Health     Financial Resource Strain:     Difficulty of Paying Living Expenses: Not on file   Food Insecurity:     Worried About Running Out of Food in the Last Year: Not on file    Jamie of Food in the Last Year: Not on file   Transportation Needs:     Lack of Transportation (Medical): Not on file    Lack of Transportation (Non-Medical): Not on file   Physical Activity:     Days of Exercise per Week: Not on file    Minutes of Exercise per Session: Not on file   Stress:     Feeling of Stress : Not on file   Social Connections:     Frequency of Communication with Friends and Family: Not on file    Frequency of Social Gatherings with Friends and Family: Not on file    Attends Baptism Services: Not on file    Active Member of [address] [place] [address] [employer] or Organizations: Not on file    Attends Club or Organization Meetings: Not on file    Marital Status: Not on file   Intimate Partner Violence:     Fear of Current or Ex-Partner: Not on file    Emotionally Abused: Not on file    Physically Abused: Not on file    Sexually Abused: Not on file   Housing Stability:     Unable to Pay for Housing in the Last Year: Not on file    Number of [place] in the Last Year: Not on file    Unstable Housing in the Last Year: Not on file       SCREENINGS        Breann Coma Scale  Eye Opening: Spontaneous  Best Verbal Response: Oriented  Best Motor Response: Obeys commands  Colby Coma Scale Score: 15               PHYSICAL EXAM    (up to 7 for level 4, 8 or more for level 5)     ED Triage Vitals [04/16/22 0833]   BP Temp Temp Source Pulse Resp SpO2 Height Weight   (!) 151/83 98.2 °F (36.8 °C) Tympanic 113 24 95 % -- --       Physical Exam  Vitals and nursing note reviewed. Constitutional:       Appearance: She is not ill-appearing or toxic-appearing.       Comments: Tachypnea, labored respirations, O2 sat in 80's despite nasal cannula  moderate acute respiratory distress   HENT:      Head: Normocephalic. Cardiovascular:      Comments: atrail fibrillation rate 130's  Pulmonary:      Comments: Moderate distress with labored respirations. Diffuse crackles  Abdominal:      Palpations: Abdomen is soft. Tenderness: There is no abdominal tenderness. There is no guarding. Musculoskeletal:         General: Normal range of motion. Skin:     General: Skin is warm and dry. Neurological:      General: No focal deficit present. Mental Status: She is alert. DIAGNOSTIC RESULTS     EKG: All EKG's are interpreted by the Emergency Department Physician who either signs or Co-signs this chart in the absence of a cardiologist.          RADIOLOGY:   Non-plain film images such as CT, Ultrasound and MRI are read by the radiologist. Plain radiographic images are visualized and preliminarily interpreted by the emergency physician with the below findings:          Interpretation per the Radiologist below, if available at the time of this note:    XR CHEST PORTABLE   Final Result   More pronounced congestion and pulmonary interstitial edema suggestive of CHF.                ED BEDSIDE ULTRASOUND:   Performed by ED Physician - none    LABS:  Labs Reviewed   BASIC METABOLIC PANEL - Abnormal; Notable for the following components:       Result Value    Glucose 111 (*)     BUN 24 (*)     CREATININE 1.08 (*)     Bun/Cre Ratio 22 (*)     Sodium 146 (*)     Potassium 3.5 (*)     GFR Non- 47 (*)     GFR  57 (*)     All other components within normal limits   CBC WITH AUTO DIFFERENTIAL - Abnormal; Notable for the following components:    RDW 16.7 (*)     Seg Neutrophils 75 (*)     Lymphocytes 18 (*)     Segs Absolute 8.15 (*)     All other components within normal limits   TROPONIN - Abnormal; Notable for the following components:    Troponin, High Sensitivity 295 (*)     All other components within normal limits   BRAIN NATRIURETIC PEPTIDE - Abnormal; Notable for the following components:    Pro-BNP 12,481 (*)     All other components within normal limits   BLOOD GAS, VENOUS - Abnormal; Notable for the following components:    pH, Moo 7.437 (*)     pCO2, Moo 33.8 (*)     HCO3, Venous 22.3 (*)     All other components within normal limits   TROPONIN - Abnormal; Notable for the following components:    Troponin, High Sensitivity 371 (*)     All other components within normal limits   COVID-19, RAPID   TSH WITH REFLEX       All other labs were within normal range or not returned as of this dictation. EMERGENCY DEPARTMENT COURSE and DIFFERENTIAL DIAGNOSIS/MDM:   Vitals:    Vitals:    04/16/22 0955 04/16/22 1035 04/16/22 1124 04/16/22 1200   BP:    (!) 155/92   Pulse: 112 96 116 129   Resp: 22 22 27 19   Temp:       TempSrc:       SpO2: 94% 95% 96% 95%             MDM  Number of Diagnoses or Management Options  Acute on chronic congestive heart failure, unspecified heart failure type (HCC)  Acute respiratory failure, unspecified whether with hypoxia or hypercapnia (HCC)  Atrial fibrillation with RVR (HCC)  Elevated troponin  Diagnosis management comments: 80year-old female presented with dyspnea started around midnight last night. On my initial evaluation patient been placed on 2 L nasal cannula via EMS she was still resting with an oxygen saturation in the mid 80s. Patient also had increased work of breathing. A BiPap was initiated. She had recently been treated with for pneumonia. She did not receive breathing treatments. No wheezing was noted on my examination. Atrial fibrillation heart rate ranging 130s. Cardizem was also given. Patient improved significantly with BiPAP. Heart rate now approximately 90 bpm remains in atrial fibrillation. He does currently take Eliquis. Troponin significantly elevated initial troponin was 295. She is not having any chest pain at this time. States he has not had any chest pain with this only shortness of breath.   Discussed treatment options with patient. They would are interested in her being transferred for further cardiac evaluation and treatment. Patient is awake alert and pleasant answering questions appropriately she is oriented and wishes to be transferred for continued management. Family is requesting [facility]. Patient's been there in the past and seeing cardiology there. Discussed with Dr. Rosalina Villa the intensivist at [facility] and the patient was accepted for transfer. Patient continues to rest comfortably on BiPAP and is without acute complaints at this time. MIPS         REASSESSMENT          CRITICAL CARE TIME   Total Critical Care time was 45 minutes, excluding separately reportable procedures. There was a high probability of clinically significant/life threatening deterioration in the patient's condition which required my urgent intervention. CONSULTS:  None    PROCEDURES:  Unless otherwise noted below, none     Procedures        FINAL IMPRESSION      1. Acute respiratory failure, unspecified whether with hypoxia or hypercapnia ([address]    2. Atrial fibrillation with RVR ([facility] [address]    3. Acute on chronic congestive heart failure, unspecified heart failure type (HCC)    4. Elevated troponin          DISPOSITION/PLAN   DISPOSITION Decision To Transfer 04/16/2022 10:13:28 AM      PATIENT REFERRED TO:  No follow-up provider specified. DISCHARGE MEDICATIONS:  Discharge Medication List as of 4/16/2022  2:49 PM        Controlled Substances Monitoring:     No flowsheet data found.     (Please note that portions of this note were completed with a voice recognition program.  Efforts were made to edit the dictations but occasionally words are mis-transcribed.)    Madonna Montanez MD (electronically signed)  Attending Emergency Physician             Madonna Montanez MD  04/17/22 8824

## 2022-05-05 ENCOUNTER — APPOINTMENT (OUTPATIENT)
Dept: NON INVASIVE DIAGNOSTICS | Age: 87
DRG: 280 | End: 2022-05-05
Payer: MEDICARE

## 2022-05-05 ENCOUNTER — APPOINTMENT (OUTPATIENT)
Dept: GENERAL RADIOLOGY | Age: 87
DRG: 280 | End: 2022-05-05
Payer: MEDICARE

## 2022-05-05 ENCOUNTER — HOSPITAL ENCOUNTER (INPATIENT)
Age: 87
LOS: 2 days | Discharge: NURSING, REHAB OR LONG-TERM CARE | DRG: 280 | End: 2022-05-07
Attending: EMERGENCY MEDICINE | Admitting: INTERNAL MEDICINE
Payer: MEDICARE

## 2022-05-05 DIAGNOSIS — Z51.5 HOSPICE CARE: ICD-10-CM

## 2022-05-05 DIAGNOSIS — I21.4 NSTEMI (NON-ST ELEVATED MYOCARDIAL INFARCTION) (HCC): ICD-10-CM

## 2022-05-05 DIAGNOSIS — J96.00 ACUTE RESPIRATORY FAILURE, UNSPECIFIED WHETHER WITH HYPOXIA OR HYPERCAPNIA (HCC): Primary | ICD-10-CM

## 2022-05-05 PROBLEM — I51.89 LEFT VENTRICULAR HYPOKINESIS: Status: ACTIVE | Noted: 2022-05-05

## 2022-05-05 PROBLEM — N18.9 ACUTE KIDNEY INJURY SUPERIMPOSED ON CKD (HCC): Status: ACTIVE | Noted: 2022-05-05

## 2022-05-05 PROBLEM — I50.23 ACUTE ON CHRONIC SYSTOLIC CHF (CONGESTIVE HEART FAILURE), NYHA CLASS 4 (HCC): Status: ACTIVE | Noted: 2022-05-05

## 2022-05-05 PROBLEM — E86.0 DEHYDRATION: Status: ACTIVE | Noted: 2022-05-05

## 2022-05-05 PROBLEM — N17.9 ACUTE KIDNEY INJURY SUPERIMPOSED ON CKD (HCC): Status: ACTIVE | Noted: 2022-05-05

## 2022-05-05 PROBLEM — J96.21 ACUTE ON CHRONIC RESPIRATORY FAILURE WITH HYPOXIA (HCC): Status: ACTIVE | Noted: 2022-05-05

## 2022-05-05 PROBLEM — E87.29 RESPIRATORY ACIDOSIS: Status: ACTIVE | Noted: 2022-05-05

## 2022-05-05 PROBLEM — J96.01 ACUTE RESPIRATORY FAILURE WITH HYPOXIA (HCC): Status: ACTIVE | Noted: 2022-05-05

## 2022-05-05 LAB
-: ABNORMAL
ABSOLUTE EOS #: 0 K/UL (ref 0–0.44)
ABSOLUTE IMMATURE GRANULOCYTE: 0 K/UL (ref 0–0.3)
ABSOLUTE LYMPH #: 4.77 K/UL (ref 1.1–3.7)
ABSOLUTE MONO #: 1.79 K/UL (ref 0.1–1.2)
ALBUMIN SERPL-MCNC: 4 G/DL (ref 3.5–5.2)
ALBUMIN/GLOBULIN RATIO: 1.4 (ref 1–2.5)
ALLEN TEST: ABNORMAL
ALLEN TEST: ABNORMAL
ALP BLD-CCNC: 107 U/L (ref 35–104)
ALT SERPL-CCNC: 7 U/L (ref 5–33)
AMORPHOUS: ABNORMAL
ANION GAP SERPL CALCULATED.3IONS-SCNC: 25 MMOL/L (ref 9–17)
AST SERPL-CCNC: 13 U/L
BACTERIA: ABNORMAL
BASOPHILS # BLD: 0 % (ref 0–2)
BASOPHILS ABSOLUTE: 0 K/UL (ref 0–0.2)
BILIRUB SERPL-MCNC: 1 MG/DL (ref 0.3–1.2)
BILIRUBIN URINE: NEGATIVE
BUN BLDV-MCNC: 27 MG/DL (ref 8–23)
BUN/CREAT BLD: 14 (ref 9–20)
CALCIUM SERPL-MCNC: 8.4 MG/DL (ref 8.6–10.4)
CASTS UA: ABNORMAL /LPF
CHLORIDE BLD-SCNC: 102 MMOL/L (ref 98–107)
CO2: 16 MMOL/L (ref 20–31)
COLOR: YELLOW
CREAT SERPL-MCNC: 1.97 MG/DL (ref 0.5–0.9)
EKG ATRIAL RATE: 125 BPM
EKG ATRIAL RATE: 97 BPM
EKG P AXIS: 69 DEGREES
EKG P-R INTERVAL: 142 MS
EKG Q-T INTERVAL: 328 MS
EKG Q-T INTERVAL: 376 MS
EKG QRS DURATION: 112 MS
EKG QRS DURATION: 152 MS
EKG QTC CALCULATION (BAZETT): 473 MS
EKG QTC CALCULATION (BAZETT): 508 MS
EKG R AXIS: -105 DEGREES
EKG R AXIS: -59 DEGREES
EKG T AXIS: 176 DEGREES
EKG T AXIS: 77 DEGREES
EKG VENTRICULAR RATE: 110 BPM
EKG VENTRICULAR RATE: 125 BPM
EOSINOPHILS RELATIVE PERCENT: 0 % (ref 1–4)
EPITHELIAL CELLS UA: ABNORMAL /HPF (ref 0–25)
FIO2: 35
GFR AFRICAN AMERICAN: 29 ML/MIN
GFR NON-AFRICAN AMERICAN: 24 ML/MIN
GFR SERPL CREATININE-BSD FRML MDRD: ABNORMAL ML/MIN/{1.73_M2}
GFR SERPL CREATININE-BSD FRML MDRD: ABNORMAL ML/MIN/{1.73_M2}
GLUCOSE BLD-MCNC: 260 MG/DL (ref 70–99)
GLUCOSE URINE: NEGATIVE
HCO3 ARTERIAL: 17.4 MMOL/L (ref 22–26)
HCO3 VENOUS: 15.5 MMOL/L (ref 24–30)
HCT VFR BLD CALC: 42.4 % (ref 36.3–47.1)
HEMOGLOBIN: 13.1 G/DL (ref 11.9–15.1)
IMMATURE GRANULOCYTES: 0 %
KETONES, URINE: NEGATIVE
LEUKOCYTE ESTERASE, URINE: NEGATIVE
LV EF: 23 %
LVEF MODALITY: NORMAL
LYMPHOCYTES # BLD: 32 % (ref 24–43)
MAGNESIUM: 2.5 MG/DL (ref 1.6–2.6)
MCH RBC QN AUTO: 30.2 PG (ref 25.2–33.5)
MCHC RBC AUTO-ENTMCNC: 30.9 G/DL (ref 28.4–34.8)
MCV RBC AUTO: 97.7 FL (ref 82.6–102.9)
MONOCYTES # BLD: 12 % (ref 3–12)
MORPHOLOGY: ABNORMAL
NEGATIVE BASE EXCESS, ART: 4.9 MMOL/L (ref 0–2)
NEGATIVE BASE EXCESS, VEN: 12.4 MMOL/L (ref 0–2)
NITRITE, URINE: NEGATIVE
NRBC AUTOMATED: 0 PER 100 WBC
O2 DEVICE/FLOW/%: ABNORMAL
O2 DEVICE/FLOW/%: ABNORMAL
O2 SAT, ARTERIAL: 98.7 % (ref 95–98)
O2 SAT, VEN: 61.2 % (ref 60–85)
PATIENT TEMP: 37
PATIENT TEMP: 37
PCO2 ARTERIAL: 26.2 MMHG (ref 35–45)
PCO2, VEN: 42.6 (ref 39–55)
PDW BLD-RTO: 16.7 % (ref 11.8–14.4)
PH ARTERIAL: 7.44 (ref 7.35–7.45)
PH UA: 6 (ref 5–9)
PH VENOUS: 7.18 (ref 7.32–7.42)
PLATELET # BLD: 226 K/UL (ref 138–453)
PMV BLD AUTO: 11.8 FL (ref 8.1–13.5)
PO2 ARTERIAL: 127.9 MMHG (ref 80–100)
PO2, VEN: 39.2 (ref 30–50)
POTASSIUM SERPL-SCNC: 3.3 MMOL/L (ref 3.7–5.3)
PRO-BNP: ABNORMAL PG/ML
PROTEIN UA: ABNORMAL
PT. POSITION: ABNORMAL
PT. POSITION: ABNORMAL
RBC # BLD: 4.34 M/UL (ref 3.95–5.11)
RBC UA: ABNORMAL /HPF (ref 0–2)
SAMPLE SITE: ABNORMAL
SAMPLE SITE: ABNORMAL
SEG NEUTROPHILS: 56 % (ref 36–65)
SEGMENTED NEUTROPHILS ABSOLUTE COUNT: 8.34 K/UL (ref 1.5–8.1)
SODIUM BLD-SCNC: 143 MMOL/L (ref 135–144)
SPECIFIC GRAVITY UA: >1.03 (ref 1.01–1.02)
TOTAL PROTEIN: 6.8 G/DL (ref 6.4–8.3)
TROPONIN, HIGH SENSITIVITY: 115 NG/L (ref 0–14)
TROPONIN, HIGH SENSITIVITY: 8643 NG/L (ref 0–14)
TURBIDITY: CLEAR
URINE HGB: NEGATIVE
UROBILINOGEN, URINE: NORMAL
WBC # BLD: 14.9 K/UL (ref 3.5–11.3)
WBC UA: ABNORMAL /HPF (ref 0–5)

## 2022-05-05 PROCEDURE — 93005 ELECTROCARDIOGRAM TRACING: CPT | Performed by: EMERGENCY MEDICINE

## 2022-05-05 PROCEDURE — 36415 COLL VENOUS BLD VENIPUNCTURE: CPT

## 2022-05-05 PROCEDURE — 81001 URINALYSIS AUTO W/SCOPE: CPT

## 2022-05-05 PROCEDURE — 83735 ASSAY OF MAGNESIUM: CPT

## 2022-05-05 PROCEDURE — 51702 INSERT TEMP BLADDER CATH: CPT

## 2022-05-05 PROCEDURE — 36600 WITHDRAWAL OF ARTERIAL BLOOD: CPT

## 2022-05-05 PROCEDURE — 87040 BLOOD CULTURE FOR BACTERIA: CPT

## 2022-05-05 PROCEDURE — 6370000000 HC RX 637 (ALT 250 FOR IP): Performed by: FAMILY MEDICINE

## 2022-05-05 PROCEDURE — 6370000000 HC RX 637 (ALT 250 FOR IP): Performed by: INTERNAL MEDICINE

## 2022-05-05 PROCEDURE — 93010 ELECTROCARDIOGRAM REPORT: CPT | Performed by: INTERNAL MEDICINE

## 2022-05-05 PROCEDURE — 80053 COMPREHEN METABOLIC PANEL: CPT

## 2022-05-05 PROCEDURE — 85025 COMPLETE CBC W/AUTO DIFF WBC: CPT

## 2022-05-05 PROCEDURE — 94664 DEMO&/EVAL PT USE INHALER: CPT

## 2022-05-05 PROCEDURE — 6360000002 HC RX W HCPCS: Performed by: EMERGENCY MEDICINE

## 2022-05-05 PROCEDURE — 2500000003 HC RX 250 WO HCPCS: Performed by: NURSE PRACTITIONER

## 2022-05-05 PROCEDURE — 84484 ASSAY OF TROPONIN QUANT: CPT

## 2022-05-05 PROCEDURE — 93005 ELECTROCARDIOGRAM TRACING: CPT | Performed by: FAMILY MEDICINE

## 2022-05-05 PROCEDURE — 2000000000 HC ICU R&B

## 2022-05-05 PROCEDURE — 93306 TTE W/DOPPLER COMPLETE: CPT

## 2022-05-05 PROCEDURE — 71045 X-RAY EXAM CHEST 1 VIEW: CPT

## 2022-05-05 PROCEDURE — 99285 EMERGENCY DEPT VISIT HI MDM: CPT

## 2022-05-05 PROCEDURE — 94660 CPAP INITIATION&MGMT: CPT

## 2022-05-05 PROCEDURE — 82805 BLOOD GASES W/O2 SATURATION: CPT

## 2022-05-05 PROCEDURE — 96365 THER/PROPH/DIAG IV INF INIT: CPT

## 2022-05-05 PROCEDURE — 99223 1ST HOSP IP/OBS HIGH 75: CPT | Performed by: FAMILY MEDICINE

## 2022-05-05 PROCEDURE — 6370000000 HC RX 637 (ALT 250 FOR IP): Performed by: EMERGENCY MEDICINE

## 2022-05-05 PROCEDURE — 96375 TX/PRO/DX INJ NEW DRUG ADDON: CPT

## 2022-05-05 PROCEDURE — 2580000003 HC RX 258: Performed by: EMERGENCY MEDICINE

## 2022-05-05 PROCEDURE — 94640 AIRWAY INHALATION TREATMENT: CPT

## 2022-05-05 PROCEDURE — 83880 ASSAY OF NATRIURETIC PEPTIDE: CPT

## 2022-05-05 RX ORDER — NITROGLYCERIN 20 MG/100ML
5-200 INJECTION INTRAVENOUS CONTINUOUS
Status: DISCONTINUED | OUTPATIENT
Start: 2022-05-05 | End: 2022-05-07 | Stop reason: HOSPADM

## 2022-05-05 RX ORDER — ONDANSETRON 2 MG/ML
4 INJECTION INTRAMUSCULAR; INTRAVENOUS EVERY 6 HOURS PRN
Status: DISCONTINUED | OUTPATIENT
Start: 2022-05-05 | End: 2022-05-07 | Stop reason: HOSPADM

## 2022-05-05 RX ORDER — METOPROLOL TARTRATE 5 MG/5ML
2.5 INJECTION INTRAVENOUS ONCE
Status: DISCONTINUED | OUTPATIENT
Start: 2022-05-05 | End: 2022-05-05

## 2022-05-05 RX ORDER — PANTOPRAZOLE SODIUM 40 MG/1
40 TABLET, DELAYED RELEASE ORAL
Status: DISCONTINUED | OUTPATIENT
Start: 2022-05-06 | End: 2022-05-07 | Stop reason: HOSPADM

## 2022-05-05 RX ORDER — POLYVINYL ALCOHOL, POVIDONE 14; 6 MG/ML; MG/ML
2 SOLUTION/ DROPS OPHTHALMIC 2 TIMES DAILY PRN
COMMUNITY

## 2022-05-05 RX ORDER — DOCUSATE SODIUM 100 MG/1
100 CAPSULE, LIQUID FILLED ORAL 2 TIMES DAILY
COMMUNITY

## 2022-05-05 RX ORDER — UREA 10 %
2 LOTION (ML) TOPICAL NIGHTLY
Status: DISCONTINUED | OUTPATIENT
Start: 2022-05-05 | End: 2022-05-05

## 2022-05-05 RX ORDER — IPRATROPIUM BROMIDE AND ALBUTEROL SULFATE 2.5; .5 MG/3ML; MG/3ML
1 SOLUTION RESPIRATORY (INHALATION) ONCE
Status: COMPLETED | OUTPATIENT
Start: 2022-05-05 | End: 2022-05-05

## 2022-05-05 RX ORDER — AMLODIPINE BESYLATE 5 MG/1
5 TABLET ORAL DAILY
Status: DISCONTINUED | OUTPATIENT
Start: 2022-05-05 | End: 2022-05-07 | Stop reason: HOSPADM

## 2022-05-05 RX ORDER — LOSARTAN POTASSIUM 50 MG/1
50 TABLET ORAL DAILY
Status: DISCONTINUED | OUTPATIENT
Start: 2022-05-05 | End: 2022-05-07 | Stop reason: HOSPADM

## 2022-05-05 RX ORDER — SERTRALINE HYDROCHLORIDE 25 MG/1
25 TABLET, FILM COATED ORAL DAILY
Status: DISCONTINUED | OUTPATIENT
Start: 2022-05-06 | End: 2022-05-07 | Stop reason: HOSPADM

## 2022-05-05 RX ORDER — CLOPIDOGREL BISULFATE 75 MG/1
75 TABLET ORAL DAILY
Status: DISCONTINUED | OUTPATIENT
Start: 2022-05-05 | End: 2022-05-07 | Stop reason: HOSPADM

## 2022-05-05 RX ORDER — ALBUTEROL SULFATE 90 UG/1
2 AEROSOL, METERED RESPIRATORY (INHALATION) 4 TIMES DAILY
Status: DISCONTINUED | OUTPATIENT
Start: 2022-05-05 | End: 2022-05-07 | Stop reason: HOSPADM

## 2022-05-05 RX ORDER — ACETAMINOPHEN 500 MG
500 TABLET ORAL EVERY 6 HOURS PRN
Status: DISCONTINUED | OUTPATIENT
Start: 2022-05-05 | End: 2022-05-05 | Stop reason: DRUGHIGH

## 2022-05-05 RX ORDER — ACETAMINOPHEN 325 MG/1
650 TABLET ORAL EVERY 6 HOURS PRN
Status: ON HOLD | COMMUNITY
End: 2022-05-06 | Stop reason: HOSPADM

## 2022-05-05 RX ORDER — METOPROLOL TARTRATE 5 MG/5ML
2.5 INJECTION INTRAVENOUS EVERY 6 HOURS PRN
Status: DISCONTINUED | OUTPATIENT
Start: 2022-05-05 | End: 2022-05-05

## 2022-05-05 RX ORDER — POLYETHYLENE GLYCOL 3350 17 G/17G
17 POWDER, FOR SOLUTION ORAL 2 TIMES DAILY PRN
COMMUNITY

## 2022-05-05 RX ORDER — ASPIRIN 325 MG
325 TABLET ORAL DAILY
Status: ON HOLD | COMMUNITY
End: 2022-05-06 | Stop reason: HOSPADM

## 2022-05-05 RX ORDER — POLYETHYLENE GLYCOL 3350 17 G/17G
17 POWDER, FOR SOLUTION ORAL DAILY PRN
Status: DISCONTINUED | OUTPATIENT
Start: 2022-05-05 | End: 2022-05-07 | Stop reason: HOSPADM

## 2022-05-05 RX ORDER — LEVOTHYROXINE SODIUM 0.1 MG/1
100 TABLET ORAL DAILY
Status: DISCONTINUED | OUTPATIENT
Start: 2022-05-06 | End: 2022-05-07 | Stop reason: HOSPADM

## 2022-05-05 RX ORDER — SODIUM CHLORIDE 0.9 % (FLUSH) 0.9 %
5-40 SYRINGE (ML) INJECTION EVERY 12 HOURS SCHEDULED
Status: DISCONTINUED | OUTPATIENT
Start: 2022-05-05 | End: 2022-05-07 | Stop reason: HOSPADM

## 2022-05-05 RX ORDER — ONDANSETRON 4 MG/1
4 TABLET, ORALLY DISINTEGRATING ORAL EVERY 8 HOURS PRN
Status: DISCONTINUED | OUTPATIENT
Start: 2022-05-05 | End: 2022-05-07 | Stop reason: HOSPADM

## 2022-05-05 RX ORDER — NITROGLYCERIN 0.4 MG/1
0.4 TABLET SUBLINGUAL EVERY 5 MIN PRN
COMMUNITY

## 2022-05-05 RX ORDER — FUROSEMIDE 10 MG/ML
40 INJECTION INTRAMUSCULAR; INTRAVENOUS 2 TIMES DAILY
Status: DISCONTINUED | OUTPATIENT
Start: 2022-05-05 | End: 2022-05-07 | Stop reason: HOSPADM

## 2022-05-05 RX ORDER — ALBUTEROL SULFATE 90 UG/1
2 AEROSOL, METERED RESPIRATORY (INHALATION) EVERY 6 HOURS PRN
Status: DISCONTINUED | OUTPATIENT
Start: 2022-05-05 | End: 2022-05-07 | Stop reason: HOSPADM

## 2022-05-05 RX ORDER — IPRATROPIUM BROMIDE AND ALBUTEROL SULFATE 2.5; .5 MG/3ML; MG/3ML
1 SOLUTION RESPIRATORY (INHALATION)
Status: DISCONTINUED | OUTPATIENT
Start: 2022-05-05 | End: 2022-05-05

## 2022-05-05 RX ORDER — FAMOTIDINE 20 MG/1
20 TABLET, FILM COATED ORAL DAILY
Status: DISCONTINUED | OUTPATIENT
Start: 2022-05-06 | End: 2022-05-06

## 2022-05-05 RX ORDER — SODIUM CHLORIDE 0.9 % (FLUSH) 0.9 %
5-40 SYRINGE (ML) INJECTION PRN
Status: DISCONTINUED | OUTPATIENT
Start: 2022-05-05 | End: 2022-05-07 | Stop reason: HOSPADM

## 2022-05-05 RX ORDER — ASPIRIN 81 MG/1
162 TABLET ORAL ONCE
Status: COMPLETED | OUTPATIENT
Start: 2022-05-05 | End: 2022-05-05

## 2022-05-05 RX ORDER — CALCIUM CARBONATE 500(1250)
500 TABLET ORAL 2 TIMES DAILY PRN
Status: ON HOLD | COMMUNITY
End: 2022-05-06 | Stop reason: HOSPADM

## 2022-05-05 RX ORDER — VIT A/VIT C/VIT E/ZINC/COPPER 4296-226
1 CAPSULE ORAL DAILY
Status: ON HOLD | COMMUNITY
End: 2022-05-06 | Stop reason: HOSPADM

## 2022-05-05 RX ORDER — FENTANYL CITRATE 50 UG/ML
25 INJECTION, SOLUTION INTRAMUSCULAR; INTRAVENOUS
Status: DISCONTINUED | OUTPATIENT
Start: 2022-05-05 | End: 2022-05-06

## 2022-05-05 RX ORDER — DONEPEZIL HYDROCHLORIDE 5 MG/1
5 TABLET, FILM COATED ORAL NIGHTLY
Status: DISCONTINUED | OUTPATIENT
Start: 2022-05-05 | End: 2022-05-07 | Stop reason: HOSPADM

## 2022-05-05 RX ORDER — SODIUM CHLORIDE 9 MG/ML
INJECTION, SOLUTION INTRAVENOUS PRN
Status: DISCONTINUED | OUTPATIENT
Start: 2022-05-05 | End: 2022-05-07 | Stop reason: HOSPADM

## 2022-05-05 RX ORDER — ACETAMINOPHEN 650 MG/1
650 SUPPOSITORY RECTAL EVERY 6 HOURS PRN
Status: DISCONTINUED | OUTPATIENT
Start: 2022-05-05 | End: 2022-05-07 | Stop reason: HOSPADM

## 2022-05-05 RX ORDER — FLUTICASONE PROPIONATE 50 MCG
1 SPRAY, SUSPENSION (ML) NASAL 2 TIMES DAILY
Status: DISCONTINUED | OUTPATIENT
Start: 2022-05-05 | End: 2022-05-07 | Stop reason: HOSPADM

## 2022-05-05 RX ORDER — METOPROLOL SUCCINATE 50 MG/1
50 TABLET, EXTENDED RELEASE ORAL DAILY
Status: DISCONTINUED | OUTPATIENT
Start: 2022-05-05 | End: 2022-05-07 | Stop reason: HOSPADM

## 2022-05-05 RX ORDER — METHYLPREDNISOLONE SODIUM SUCCINATE 125 MG/2ML
125 INJECTION, POWDER, LYOPHILIZED, FOR SOLUTION INTRAMUSCULAR; INTRAVENOUS ONCE
Status: COMPLETED | OUTPATIENT
Start: 2022-05-05 | End: 2022-05-05

## 2022-05-05 RX ORDER — LANOLIN ALCOHOL/MO/W.PET/CERES
3 CREAM (GRAM) TOPICAL NIGHTLY
Status: DISCONTINUED | OUTPATIENT
Start: 2022-05-05 | End: 2022-05-07 | Stop reason: HOSPADM

## 2022-05-05 RX ORDER — ASPIRIN 81 MG/1
81 TABLET, CHEWABLE ORAL DAILY
Status: DISCONTINUED | OUTPATIENT
Start: 2022-05-06 | End: 2022-05-07 | Stop reason: HOSPADM

## 2022-05-05 RX ORDER — ALBUTEROL SULFATE 90 UG/1
2 AEROSOL, METERED RESPIRATORY (INHALATION) EVERY 6 HOURS PRN
Status: DISCONTINUED | OUTPATIENT
Start: 2022-05-05 | End: 2022-05-05

## 2022-05-05 RX ORDER — METOPROLOL TARTRATE 5 MG/5ML
2.5 INJECTION INTRAVENOUS
Status: DISCONTINUED | OUTPATIENT
Start: 2022-05-05 | End: 2022-05-05

## 2022-05-05 RX ORDER — MONTELUKAST SODIUM 10 MG/1
10 TABLET ORAL DAILY
Status: ON HOLD | COMMUNITY
End: 2022-05-06 | Stop reason: HOSPADM

## 2022-05-05 RX ORDER — FUROSEMIDE 10 MG/ML
20 INJECTION INTRAMUSCULAR; INTRAVENOUS ONCE
Status: COMPLETED | OUTPATIENT
Start: 2022-05-05 | End: 2022-05-05

## 2022-05-05 RX ORDER — ACETAMINOPHEN 325 MG/1
650 TABLET ORAL EVERY 6 HOURS PRN
Status: DISCONTINUED | OUTPATIENT
Start: 2022-05-05 | End: 2022-05-07 | Stop reason: HOSPADM

## 2022-05-05 RX ADMIN — CLOPIDOGREL BISULFATE 75 MG: 75 TABLET ORAL at 18:52

## 2022-05-05 RX ADMIN — ALBUTEROL SULFATE 2 PUFF: 90 AEROSOL, METERED RESPIRATORY (INHALATION) at 20:23

## 2022-05-05 RX ADMIN — APIXABAN 2.5 MG: 2.5 TABLET, FILM COATED ORAL at 18:52

## 2022-05-05 RX ADMIN — DONEPEZIL HYDROCHLORIDE 5 MG: 5 TABLET, FILM COATED ORAL at 20:12

## 2022-05-05 RX ADMIN — FUROSEMIDE 20 MG: 10 INJECTION, SOLUTION INTRAVENOUS at 12:34

## 2022-05-05 RX ADMIN — NITROGLYCERIN 5 MCG/MIN: 20 INJECTION INTRAVENOUS at 17:24

## 2022-05-05 RX ADMIN — METOPROLOL TARTRATE 25 MG: 25 TABLET, FILM COATED ORAL at 19:16

## 2022-05-05 RX ADMIN — MELATONIN 3 MG ORAL TABLET 3 MG: 3 TABLET ORAL at 20:12

## 2022-05-05 RX ADMIN — CEFTRIAXONE SODIUM 1000 MG: 1 INJECTION, POWDER, FOR SOLUTION INTRAMUSCULAR; INTRAVENOUS at 11:11

## 2022-05-05 RX ADMIN — FLUTICASONE PROPIONATE 1 SPRAY: 50 SPRAY, METERED NASAL at 20:12

## 2022-05-05 RX ADMIN — ASPIRIN 162 MG: 81 TABLET, COATED ORAL at 18:52

## 2022-05-05 RX ADMIN — IPRATROPIUM BROMIDE AND ALBUTEROL SULFATE 1 AMPULE: 2.5; .5 SOLUTION RESPIRATORY (INHALATION) at 10:17

## 2022-05-05 RX ADMIN — METHYLPREDNISOLONE SODIUM SUCCINATE 125 MG: 125 INJECTION, POWDER, FOR SOLUTION INTRAMUSCULAR; INTRAVENOUS at 10:11

## 2022-05-05 RX ADMIN — METOPROLOL TARTRATE 25 MG: 25 TABLET, FILM COATED ORAL at 20:16

## 2022-05-05 ASSESSMENT — PAIN DESCRIPTION - ORIENTATION
ORIENTATION: MID

## 2022-05-05 ASSESSMENT — PAIN DESCRIPTION - FREQUENCY
FREQUENCY: CONTINUOUS

## 2022-05-05 ASSESSMENT — PAIN DESCRIPTION - DESCRIPTORS
DESCRIPTORS: TIGHTNESS

## 2022-05-05 ASSESSMENT — PAIN DESCRIPTION - LOCATION
LOCATION: CHEST

## 2022-05-05 ASSESSMENT — PAIN SCALES - GENERAL
PAINLEVEL_OUTOF10: 0
PAINLEVEL_OUTOF10: 1
PAINLEVEL_OUTOF10: 0

## 2022-05-05 NOTE — ED NOTES
Contacted Dr. Maksim Martinez office per Dr. Laruen Moss request. Dr. Maksim Martinez called back during the call with his office to speak to Dr. Estelle Peterson Police  05/05/22 0750

## 2022-05-05 NOTE — ED PROVIDER NOTES
[facility] [place] ED  EMERGENCY DEPARTMENT ENCOUNTER      Pt Name: Nida Mckeon  MRN: [id number]  [place] 9/3/1928  Date of evaluation: 5/5/2022  Provider: Vincent Duque MD    CHIEF COMPLAINT       Chief Complaint   Patient presents with    Respiratory Distress         HISTORY OF PRESENT ILLNESS   (Location/Symptom, Timing/Onset, Context/Setting, Quality, Duration, Modifying Factors, Severity)  Note limiting factors. Nida Mckeon is a 80 y.o. female who presents to the emergency department      80year-old female brought to the emergency department from her F for acute respiratory failure. Is noted by staff to be short of breath this morning. When EMS arrived the patient was alert and was able to speak to them. By the time the patient arrived to the emergency department he was unresponsive. Bag valve respirations in process. Patient did have a pulse. He was pale and cool. DNR CCA paperwork sent by [facility]. Patient specifically wanted no intubation. Nursing Notes were reviewed. REVIEW OF SYSTEMS    (2-9 systems for level 4, 10 or more for level 5)     Review of Systems   Unable to perform ROS: Severe respiratory distress       Except as noted above the remainder of the review of systems was reviewed and negative.        PAST MEDICAL HISTORY     Past Medical History:   Diagnosis Date    Arthritis     Asthma     Atrial fibrillation ([facility] [address]     CAD (coronary artery disease)     Fibromyalgia     History of stroke     Hyperlipidemia     Hypertension     Hypothyroidism     MI (myocardial infarction) ([facility] [address]          SURGICAL HISTORY       Past Surgical History:   Procedure Laterality Date    APPENDECTOMY      CHOLECYSTECTOMY      HYSTERECTOMY      OTHER SURGICAL HISTORY Left 5/26/15    lumbar OZZIE    TONSILLECTOMY           CURRENT MEDICATIONS       Previous Medications    ACETAMINOPHEN (TYLENOL) 325 MG TABLET    Take 650 mg by mouth every 6 hours as needed for Pain or Fever ACETAMINOPHEN (TYLENOL) 500 MG TABLET    Take 500 mg by mouth every 6 hours as needed for Pain    ALBUTEROL (PROVENTIL HFA;VENTOLIN HFA) 108 (90 BASE) MCG/ACT INHALER    Inhale 2 puffs into the lungs every 6 hours as needed for Wheezing    AMLODIPINE (NORVASC) 5 MG TABLET    Take 5 mg by mouth daily    APIXABAN (ELIQUIS) 2.5 MG TABS TABLET    Take by mouth 2 times daily    ASPIRIN 325 MG TABLET    Take 325 mg by mouth daily    CALCIUM CARBONATE (OSCAL) 500 MG TABS TABLET    Take 500 mg by mouth 2 times daily as needed    DICLOFENAC SODIUM 1 % GEL    Apply 2 g topically 2 times daily    DOCUSATE SODIUM (COLACE) 100 MG CAPSULE    Take 100 mg by mouth 2 times daily    DONEPEZIL (ARICEPT) 5 MG TABLET    Take 5 mg by mouth nightly    FAMOTIDINE (PEPCID) 40 MG TABLET    Take 20 mg by mouth daily     FLUTICASONE (FLONASE) 50 MCG/ACT NASAL SPRAY    1 spray by Each Nostril route 2 times daily    FUROSEMIDE (LASIX) 20 MG TABLET    Take 1 tablet by mouth 2 times daily for 5 days    LACTOBACILLUS RHAMNOSUS, GG, PO    Take 1 caplet by mouth daily    LEVOTHYROXINE (SYNTHROID) 112 MCG TABLET    Take 100 mcg by mouth Daily     LOSARTAN (COZAAR) 50 MG TABLET    Take 50 mg by mouth at bedtime     MELATONIN 3 MG TABS TABLET    Take 3 mg by mouth nightly     METOPROLOL (TOPROL-XL) 100 MG XL TABLET    Take 100 mg by mouth 2 times daily     MONTELUKAST (SINGULAIR) 10 MG TABLET    Take 10 mg by mouth daily    MULTIPLE VITAMINS-MINERALS (ICAPS) CAPS    Take 1 capsule by mouth daily    NITROGLYCERIN (NITROSTAT) 0.4 MG SL TABLET    Place 0.4 mg under the tongue every 5 minutes as needed for Chest pain up to max of 3 total doses. If no relief after 1 dose, call 911.     OMEPRAZOLE (PRILOSEC) 20 MG CAPSULE    Take 40 mg by mouth daily    POLYETHYLENE GLYCOL (GLYCOLAX) 17 G PACKET    Take 17 g by mouth 2 times daily as needed for Constipation    POLYVINYL ALCOHOL-POVIDONE PF (REFRESH) 1.4-0.6 % SOLN    Apply 2 drops to eye 2 times daily as needed (Both eyes PRN dry eyes)    SERTRALINE (ZOLOFT) 25 MG TABLET    Take 25 mg by mouth daily       ALLERGIES     Ambien [zolpidem tartrate], Amiodarone, Cymbalta [duloxetine hcl], Griseofulvin ultramicrosize [griseofulvin], and Lyrica [pregabalin]    FAMILY HISTORY     History reviewed. No pertinent family history. SOCIAL HISTORY       Social History     Socioeconomic History    Marital status:      Spouse name: None    Number of children: None    Years of education: None    Highest education level: None   Occupational History    None   Tobacco Use    Smoking status: Never Smoker    Smokeless tobacco: Never Used   Substance and Sexual Activity    Alcohol use: Not Currently    Drug use: Never    Sexual activity: None   Other Topics Concern    None   Social History Narrative    None     Social Determinants of Health     Financial Resource Strain:     Difficulty of Paying Living Expenses: Not on file   Food Insecurity:     Worried About Running Out of Food in the Last Year: Not on file    Jamie of Food in the Last Year: Not on file   Transportation Needs:     Lack of Transportation (Medical): Not on file    Lack of Transportation (Non-Medical):  Not on file   Physical Activity:     Days of Exercise per Week: Not on file    Minutes of Exercise per Session: Not on file   Stress:     Feeling of Stress : Not on file   Social Connections:     Frequency of Communication with Friends and Family: Not on file    Frequency of Social Gatherings with Friends and Family: Not on file    Attends [employer] Services: Not on file    Active Member of Clubs or Organizations: Not on file    Attends Club or Organization Meetings: Not on file    Marital Status: Not on file   Intimate Partner Violence:     Fear of Current or Ex-Partner: Not on file    Emotionally Abused: Not on file    Physically Abused: Not on file    Sexually Abused: Not on file   Housing Stability:     Unable to Pay for Housing in the Last Year: Not on file    Number of Places Lived in the Last Year: Not on file    Unstable Housing in the Last Year: Not on file       SCREENINGS                        PHYSICAL EXAM    (up to 7 for level 4, 8 or more for level 5)     ED Triage Vitals   BP Temp Temp Source Pulse Resp SpO2 Height Weight   05/05/22 1001 05/05/22 1001 05/05/22 1001 05/05/22 1001 05/05/22 1010 05/05/22 1010 -- --   124/77 96.6 °F (35.9 °C) Tympanic 107 24 98 %         Physical Exam  Vitals and nursing note reviewed. Constitutional:       Comments: Unresponsive. Elderly, frail, minimal respiratory effort. Bag valve respirations in progress   HENT:      Head: Normocephalic and atraumatic. Cardiovascular:      Comments: Sinus tachycardia  Pulmonary:      Comments: Minimal air exchange noted bilaterally with bagging. Wheezing noted left anterior chest.  Severe respiratory distress  Abdominal:      General: There is no distension. Palpations: Abdomen is soft. Musculoskeletal:         General: No swelling. Neurological:      Comments: Minimally responsive on arrival.  Poor respiratory effort         DIAGNOSTIC RESULTS     EKG: All EKG's are interpreted by the Emergency Department Physician who either signs or Co-signs this chart in the absence of a cardiologist.    Sinus tachycardia 125 bpm.  Poor baseline. Chronic right bundle branch block.   Quality EKG without acute findings of infarction    RADIOLOGY:   Non-plain film images such as CT, Ultrasound and MRI are read by the radiologist. Plain radiographic images are visualized and preliminarily interpreted by the emergency physician with the below findings:        Interpretation per the Radiologist below, if available at the time of this note:    XR CHEST PORTABLE   Final Result      Probable CHF               ED BEDSIDE ULTRASOUND:   Performed by ED Physician - none    LABS:  Labs Reviewed   BLOOD GAS, VENOUS - Abnormal; Notable for the following components: Result Value    pH, Moo 7.180 (*)     HCO3, Venous 15.5 (*)     Negative Base Excess, Moo 12.4 (*)     All other components within normal limits   CBC WITH AUTO DIFFERENTIAL - Abnormal; Notable for the following components:    WBC 14.9 (*)     RDW 16.7 (*)     Eosinophils % 0 (*)     Segs Absolute 8.34 (*)     Absolute Lymph # 4.77 (*)     Absolute Mono # 1.79 (*)     All other components within normal limits   COMPREHENSIVE METABOLIC PANEL W/ REFLEX TO MG FOR LOW K - Abnormal; Notable for the following components:    Glucose 260 (*)     BUN 27 (*)     CREATININE 1.97 (*)     Calcium 8.4 (*)     Potassium 3.3 (*)     CO2 16 (*)     Anion Gap 25 (*)     Alkaline Phosphatase 107 (*)     GFR Non- 24 (*)     GFR  29 (*)     All other components within normal limits   TROPONIN - Abnormal; Notable for the following components:    Troponin, High Sensitivity 115 (*)     All other components within normal limits   BRAIN NATRIURETIC PEPTIDE - Abnormal; Notable for the following components:    Pro-BNP >62,000 (*)     All other components within normal limits   URINALYSIS WITH REFLEX TO CULTURE - Abnormal; Notable for the following components:    Specific Gravity, UA >1.030 (*)     Protein, UA 2+ (*)     All other components within normal limits   MICROSCOPIC URINALYSIS - Abnormal; Notable for the following components:    Bacteria, UA 1+ (*)     Amorphous, UA TRACE (*)     All other components within normal limits   CULTURE, BLOOD 1   CULTURE, BLOOD 2   MAGNESIUM       All other labs were within normal range or not returned as of this dictation.     EMERGENCY DEPARTMENT COURSE and DIFFERENTIAL DIAGNOSIS/MDM:   Vitals:    Vitals:    05/05/22 1340 05/05/22 1400 05/05/22 1410 05/05/22 1442   BP: 110/69 121/70 105/62    Pulse: 111 106 113    Resp: 17 16 17 18   Temp:       TempSrc:       SpO2: 100% 100% 100%            MDM  Number of Diagnoses or Management Options  Acute respiratory failure, unspecified whether with hypoxia or hypercapnia ([facility] [address]  Diagnosis management comments: 80-year-old female DNR CCA sent from her ECF for respiratory distress. Patient was in severe respiratory distress on arrival.  BiPAP was initiated. DuoNeb and Solu-Medrol also ordered. After several minutes of BiPAP patient significantly improved. Now sitting up awake alert answering questions appropriately. Significant improvement in air exchange. Cardiomegaly noted on chest x-ray. Lasix as ordered. Anita Carpio studies reviewed. Patient's troponin today is 115. Repeat was ordered. On her previous visit her troponin was over 300. BNP is greater than 62,000. She does have a white count of 14.9. She has not had any cough and is afebrile. IV Rocephin has been ordered. Patient's creatinine is one 1.97. Patient continues to improve. She is resting with family members she remains on BiPAP but is conversing without difficulty. Plans were discussed with patient patient would prefer to stay locally if possible. Discussed with Dr. Jason Mahan and patient was accepted for admission. [facility]       REASSESSMENT          CRITICAL CARE TIME   Total Critical Care time was 40 minutes, excluding separately reportable procedures. There was a high probability of clinically significant/life threatening deterioration in the patient's condition which required my urgent intervention. CONSULTS:  IP CONSULT TO CARDIOLOGY  IP CONSULT TO HEART FAILURE NURSE/COORDINATOR  IP CONSULT TO DIETITIAN    PROCEDURES:  Unless otherwise noted below, none     Procedures        FINAL IMPRESSION      1.  Acute respiratory failure, unspecified whether with hypoxia or hypercapnia ([facility] [address]          DISPOSITION/PLAN   DISPOSITION Admitted 05/05/2022 01:27:18 PM      PATIENT REFERRED TO:  MANUEL Butler at [address]:  New Prescriptions    No medications on file     Controlled Substances Monitoring:     No flowsheet data found.     (Please note that portions of this note were completed with a voice recognition program.  Efforts were made to edit the dictations but occasionally words are mis-transcribed.)    Tony Ruiz MD (electronically signed)  Attending Emergency Physician             Tony Ruiz MD  05/05/22 4886

## 2022-05-05 NOTE — PROGRESS NOTES
[facility]  Inpatient/Observation/Outpatient Rehabilitation    Date: 2022  Patient Name: Robby Tariq       [x] Inpatient Acute/Observation  : 9/3/1928     [x] Pt cancelled due to:  [] No Reason Given   [] Sick/ill   [x] Other:  Per RN, Patient had other staff waiting to see her and she had not seen the patient. Hold pt at this time. Therapist/Assistant will attempt to see this patient, at our earliest opportunity.        Suri Doan, PT, DPT Date: 2022

## 2022-05-05 NOTE — H&P
ICU Admission [facility] Medicine  History and Physical    Patient:  Rito Ward  MRN: [id number]    Chief Complaint: Difficulty breathing    History Obtained From:  electronic medical record    PCP: Lavelle Red MD    History of Present Illness: The patient is a 80 y.o. female who presented to the emergency room from her F with an acute respiratory failure. The staff reported that she was short of breath in the morning. When EMS arrived they stated the patient was alert and able to speak to them. Upon arrival to the emergency room patient was unresponsive and required bag valve respirations. Patient did have a pulse however was pale and cool. Patient is a DNR CCA as paperwork was provided by [facility] specifically the patient wanted no intubation. She did recover and became more alert. She is alert and oriented. She reported MI several years ago and had another April 14 and was transported to [facility]. She denied any stents or CABG. She stated her  passed away 2 days prior to the MI. She stated she has been in the [facility] since February. She reported \"severe\" SOB and Chest discomfort with diaphoresis and \"hurt all over and into her legs\". She stated she does have history of Fibromyalgia. She was found to have Metabolic Acidosis with elevated Troponin and elevated BNP with Acute renal failure. Stated she has had decrease appetite and fluid intake. She did state that she does not want any heroics.      Past Medical History:        Diagnosis Date    Acute kidney injury superimposed on CKD ([address] 5/5/2022    Acute on chronic systolic CHF (congestive heart failure), NYHA class 4 (HCC) 5/5/2022    Arthritis     Asthma     Atrial fibrillation (HCC)     CAD (coronary artery disease)     Dehydration 5/5/2022    Fibromyalgia     History of stroke     Hyperlipidemia     Hypertension     Hypothyroidism     Left ventricular hypokinesis-severe 5/5/2022    MI (myocardial infarction) ([address]     NSTEMI (non-ST elevated myocardial infarction) ([facility] [address] 5/5/2022       Past Surgical History:        Procedure Laterality Date    APPENDECTOMY      CHOLECYSTECTOMY      HYSTERECTOMY      OTHER SURGICAL HISTORY Left 5/26/15    lumbar OZZIE    TONSILLECTOMY         Medications Prior to Admission:    Prior to Admission medications    Medication Sig Start Date End Date Taking? Authorizing Provider   aspirin 325 MG tablet Take 325 mg by mouth daily   Yes Historical Provider, MD   calcium carbonate (OSCAL) 500 MG TABS tablet Take 500 mg by mouth 2 times daily as needed   Yes Historical Provider, MD   docusate sodium (COLACE) 100 MG capsule Take 100 mg by mouth 2 times daily   Yes Historical Provider, MD   LACTOBACILLUS RHAMNOSUS, GG, PO Take 1 caplet by mouth daily   Yes Historical Provider, MD   Multiple Vitamins-Minerals (ICAPS) CAPS Take 1 capsule by mouth daily   Yes Historical Provider, MD   polyethylene glycol (GLYCOLAX) 17 g packet Take 17 g by mouth 2 times daily as needed for Constipation   Yes Historical Provider, MD   montelukast (SINGULAIR) 10 MG tablet Take 10 mg by mouth daily   Yes Historical Provider, MD   nitroGLYCERIN (NITROSTAT) 0.4 MG SL tablet Place 0.4 mg under the tongue every 5 minutes as needed for Chest pain up to max of 3 total doses. If no relief after 1 dose, call 911.    Yes Historical Provider, MD   Polyvinyl Alcohol-Povidone PF (REFRESH) 1.4-0.6 % SOLN Apply 2 drops to eye 2 times daily as needed (Both eyes PRN dry eyes)   Yes Historical Provider, MD   acetaminophen (TYLENOL) 325 MG tablet Take 650 mg by mouth every 6 hours as needed for Pain or Fever   Yes Historical Provider, MD   sertraline (ZOLOFT) 25 MG tablet Take 25 mg by mouth daily    Historical Provider, MD   fluticasone (FLONASE) 50 MCG/ACT nasal spray 1 spray by Each Nostril route 2 times daily    Historical Provider, MD   furosemide (LASIX) 20 MG tablet Take 1 tablet by mouth 2 times daily for 5 days  Patient taking differently: Take 40 mg by mouth daily  7/17/20 2/7/22  Jordan Mcelroy II, PA-C   metoprolol (TOPROL-XL) 100 MG XL tablet Take 100 mg by mouth 2 times daily     Historical Provider, MD   amLODIPine (NORVASC) 5 MG tablet Take 5 mg by mouth daily    Historical Provider, MD   donepezil (ARICEPT) 5 MG tablet Take 5 mg by mouth nightly    Historical Provider, MD   apixaban (ELIQUIS) 2.5 MG TABS tablet Take by mouth 2 times daily    Historical Provider, MD   levothyroxine (SYNTHROID) 112 MCG tablet Take 100 mcg by mouth Daily     Historical Provider, MD   losartan (COZAAR) 50 MG tablet Take 50 mg by mouth at bedtime     Historical Provider, MD   melatonin 3 MG TABS tablet Take 3 mg by mouth nightly     Historical Provider, MD   omeprazole (PRILOSEC) 20 MG capsule Take 40 mg by mouth daily    Historical Provider, MD   famotidine (PEPCID) 40 MG tablet Take 20 mg by mouth daily     Historical Provider, MD   albuterol (PROVENTIL HFA;VENTOLIN HFA) 108 (90 BASE) MCG/ACT inhaler Inhale 2 puffs into the lungs every 6 hours as needed for Wheezing    Historical Provider, MD   acetaminophen (TYLENOL) 500 MG tablet Take 500 mg by mouth every 6 hours as needed for Pain    Historical Provider, MD   diclofenac sodium 1 % GEL Apply 2 g topically 2 times daily    Historical Provider, MD       Allergies:  Ambien [zolpidem tartrate], Amiodarone, Cymbalta [duloxetine hcl], Griseofulvin ultramicrosize [griseofulvin], and Lyrica [pregabalin]    Social History:   TOBACCO:   reports that she has never smoked. She has never used smokeless tobacco.  ETOH:   reports previous alcohol use. Family History:   History reviewed. No pertinent family history. Allergies:  Ambien [zolpidem tartrate], Amiodarone, Cymbalta [duloxetine hcl], Griseofulvin ultramicrosize [griseofulvin], and Lyrica [pregabalin]    Medications Prior to Admission:    Prior to Admission medications    Medication Sig Start Date End Date Taking?  Authorizing Provider   aspirin 325 MG tablet Take 325 mg by mouth daily   Yes Historical Provider, MD   calcium carbonate (OSCAL) 500 MG TABS tablet Take 500 mg by mouth 2 times daily as needed   Yes Historical Provider, MD   docusate sodium (COLACE) 100 MG capsule Take 100 mg by mouth 2 times daily   Yes Historical Provider, MD   LACTOBACILLUS RHAMNOSUS, GG, PO Take 1 caplet by mouth daily   Yes Historical Provider, MD   Multiple Vitamins-Minerals (ICAPS) CAPS Take 1 capsule by mouth daily   Yes Historical Provider, MD   polyethylene glycol (GLYCOLAX) 17 g packet Take 17 g by mouth 2 times daily as needed for Constipation   Yes Historical Provider, MD   montelukast (SINGULAIR) 10 MG tablet Take 10 mg by mouth daily   Yes Historical Provider, MD   nitroGLYCERIN (NITROSTAT) 0.4 MG SL tablet Place 0.4 mg under the tongue every 5 minutes as needed for Chest pain up to max of 3 total doses. If no relief after 1 dose, call 911.    Yes Historical Provider, MD   Polyvinyl Alcohol-Povidone PF (REFRESH) 1.4-0.6 % SOLN Apply 2 drops to eye 2 times daily as needed (Both eyes PRN dry eyes)   Yes Historical Provider, MD   acetaminophen (TYLENOL) 325 MG tablet Take 650 mg by mouth every 6 hours as needed for Pain or Fever   Yes Historical Provider, MD   sertraline (ZOLOFT) 25 MG tablet Take 25 mg by mouth daily    Historical Provider, MD   fluticasone (FLONASE) 50 MCG/ACT nasal spray 1 spray by Each Nostril route 2 times daily    Historical Provider, MD   furosemide (LASIX) 20 MG tablet Take 1 tablet by mouth 2 times daily for 5 days  Patient taking differently: Take 40 mg by mouth daily  7/17/20 2/7/22  Naila Mcelroy II, PA-C   metoprolol (TOPROL-XL) 100 MG XL tablet Take 100 mg by mouth 2 times daily     Historical Provider, MD   amLODIPine (NORVASC) 5 MG tablet Take 5 mg by mouth daily    Historical Provider, MD   donepezil (ARICEPT) 5 MG tablet Take 5 mg by mouth nightly    Historical Provider, MD   apixaban (ELIQUIS) 2.5 MG TABS tablet Take by mouth 2 times daily Historical Provider, MD   levothyroxine (SYNTHROID) 112 MCG tablet Take 100 mcg by mouth Daily     Historical Provider, MD   losartan (COZAAR) 50 MG tablet Take 50 mg by mouth at bedtime     Historical Provider, MD   melatonin 3 MG TABS tablet Take 3 mg by mouth nightly     Historical Provider, MD   omeprazole (PRILOSEC) 20 MG capsule Take 40 mg by mouth daily    Historical Provider, MD   famotidine (PEPCID) 40 MG tablet Take 20 mg by mouth daily     Historical Provider, MD   albuterol (PROVENTIL HFA;VENTOLIN HFA) 108 (90 BASE) MCG/ACT inhaler Inhale 2 puffs into the lungs every 6 hours as needed for Wheezing    Historical Provider, MD   acetaminophen (TYLENOL) 500 MG tablet Take 500 mg by mouth every 6 hours as needed for Pain    Historical Provider, MD   diclofenac sodium 1 % GEL Apply 2 g topically 2 times daily    Historical Provider, MD       Review of Systems:  Constitutional:negative  for fevers, and negative for chills. Eyes: negative for visual disturbance   ENT: negative for sore throat, negative nasal congestion, and negative for earache  Respiratory: positive for shortness of breath, negative for cough, and negative for wheezing  Cardiovascular: positive for chest pain, positive for palpitations, and negative for syncope, positive diaphoresis  Gastrointestinal: negative for abdominal pain, negative for nausea,negative for vomiting, negative for diarrhea, negative for constipation, and negative for hematochezia or melena  Genitourinary: negative for dysuria, negative for urinary urgency, negative for urinary frequency, and negative for hematuria  Skin: negative for skin rash, and negative for skin lesions  Neurological: negative for unilateral weakness, numbness or tingling. Physical Exam:    Vitals:    Temp: 97.7 °F (36.5 °C)  BP: (!) 135/93  Resp: 23  Pulse: 99  SpO2: 97 % on BiPAP  SpO2 range:   SpO2  Av.6 %  Min: 93 %  Max: 100 %    Exam:  GEN:    Awake, alert and oriented x3.    EYES:  EOMI, pupils equal   NECK: Supple. No lymphadenopathy. No carotid bruit  CVS:    irregularly irregular, tachycardia  systolic murmur  PULM:  diminished but clear breath sounds without wheezing, rales or rhonchi, Pt exhibiting mild-moderate respiratory distress   ABD:    Bowels sounds normal.  Abdomen is soft. No distention. no tenderness to palpation. EXT:   no edema bilaterally . No calf tenderness. NEURO: Moves all extremities. Motor and sensory are grossly intact  SKIN:  No rashes.   No skin lesions.    -----------------------------------------------------------------  Diagnostic Data:     CBC:   Lab Results   Component Value Date    WBC 14.9 (H) 05/05/2022    RBC 4.34 05/05/2022    HGB 13.1 05/05/2022    HCT 42.4 05/05/2022    MCV 97.7 05/05/2022     05/05/2022      Lab Results   Component Value Date    SEGS 56 05/05/2022    NEUTROABS 8.34 (H) 05/05/2022    LYMPHOPCT 32 05/05/2022    LYMPHSABS 4.77 (H) 05/05/2022    MONOPCT 12 05/05/2022    EOSRELPCT 0 (L) 05/05/2022    BASOPCT 0 05/05/2022    IMMGRAN 0 05/05/2022     CMP:   Lab Results   Component Value Date    GLUCOSE 260 (H) 05/05/2022    BUN 27 (H) 05/05/2022    CREATININE 1.97 (H) 05/05/2022     05/05/2022    K 3.3 (L) 05/05/2022    CALCIUM 8.4 (L) 05/05/2022     05/05/2022    CO2 16 (L) 05/05/2022    PROT 6.8 05/05/2022    LABALBU 4.0 05/05/2022    BILITOT 1.00 05/05/2022    ALKPHOS 107 (H) 05/05/2022    ALT 7 05/05/2022    AST 13 05/05/2022     UA:   Lab Results   Component Value Date    COLORU Yellow 05/05/2022    SPECGRAV >1.030 (H) 05/05/2022    WBCUA 5 TO 10 05/05/2022    RBCUA None 05/05/2022    EPITHUA 0 TO 2 05/05/2022    LEUKOCYTESUR NEGATIVE 05/05/2022    GLUCOSEU NEGATIVE 05/05/2022    KETUA NEGATIVE 05/05/2022    PROTEINU 2+ (A) 05/05/2022    HGBUR NEGATIVE 05/05/2022    CASTUA 0 TO 2 FINE GRANULAR 05/05/2022    CRYSTUA NOT REPORTED 02/07/2022    BACTERIA 1+ (A) 05/05/2022    YEAST NOT REPORTED 02/07/2022     Lactic Acid: No results found for: LACTA  D-Dimer:  No results found for: DDIMER  PT/INR:  Lab Results   Component Value Date    PROTIME 30.2 03/28/2013    INR 2.8 03/28/2013     Troponin:  No results for input(s): TROPONINI in the last 72 hours. ABGs:   No results found for: PHART, PH, HTX9DFT, PCO2, PO2ART, PO2, TXZ8PVA, HCO3, BEART, BE, THGBART, THB, PZI4AEI, D1KSLTTH, O2SAT, FIO2      EKG reviewed        Imaging Data:  XR CHEST PORTABLE   Final Result      Probable CHF                 Assessment:    Principal Problem:    Acute respiratory failure with hypoxia (HCC)  Active Problems:    Acute on chronic systolic CHF (congestive heart failure), NYHA class 4 ([facility])    NSTEMI (non-ST elevated myocardial infarction) ([address]    Left ventricular hypokinesis-severe    Acute kidney injury superimposed on CKD (HCC)    Dehydration    Atrial fibrillation (HCC)    CAD (coronary artery disease)    History of stroke    Hypertension    Hypothyroidism  Resolved Problems:    * No resolved hospital problems.  *      Patient Active Problem List    Diagnosis Date Noted    Acute respiratory failure with hypoxia ([address] 05/05/2022    Acute on chronic systolic CHF (congestive heart failure), NYHA class 4 ([address] 05/05/2022    NSTEMI (non-ST elevated myocardial infarction) ([address] 05/05/2022    Left ventricular hypokinesis-severe 05/05/2022    Acute kidney injury superimposed on CKD ([address] 05/05/2022    Dehydration 05/05/2022    CAD (coronary artery disease) 03/02/2022    History of stroke 03/02/2022    Hyperlipidemia 03/02/2022    Hypertension 03/02/2022    Hypothyroidism 03/02/2022    Atrial fibrillation ([facility] [address]        Plan:     · This patient requires ICU admission because of Acute on chronic respiratory failure  · Estimated length of stay is 5 days  · Discussed patient's symptoms and data results including labs and imaging studies with the ER MD at time of admission  · Antibiotics: Rocephin x 1 dose   · Fluids: contraindicated due to symptomatic CHF and fluid overload   · Labs: monitor CBC with diff and CMP daily. Trend Lactic acid  · Cultures: blood cultures x 2 and urine culture drawn in ER   · Imaging: all imaging studies reviewed   · Vasopressors: not indicated at this time   · Consults: Cardiology   · Repeat ABG  · Trend labs  · Acute on Chronic Systolic CHF, class 4  · Echocardiogram on 4/2022--[facility]--FINDINGS   LEFT VENTRICLE: The cavity size is normal. Wall thickness is mildly   increased. The estimated ejection fraction is 15-20%. Severe diffuse hypokinesis. Regional wall motion abnormalities: Akinesis of the entireanteroseptal and apical myocardium. LEFT ATRIUM: The atrium is markedly dilated. RIGHT VENTRICLE: The cavity size is normal. Wall thickness is normal. Systolic function is normal. RIGHT ATRIUM: The atrium is moderately dilated. MITRAL VALVE: Mildly calcified annulus. Normal (thickness) leaflets. Leaflet motion is normal. Doppler: Transvalvular velocity is within the normal range. There is no evidence for stenosis. There is mild regurgitation. AORTIC VALVE: Trileaflet; moderately thickened leaflets. Doppler: There is no stenosis. There is mild regurgitation. TRICUSPID VALVE: Structurally normal valve. Normal thickness leaflets. Leaflet motion is normal. Doppler: Transvalvular velocity is within the normal range. There is no evidence for stenosis. There is mild regurgitation. PULMONIC VALVE: Leaflets not well visualized. Doppler: There is trivial regurgitation. AORTA: Aortic root: The aortic root is normal. PERICARDIUM: There is no pericardial effusion. SYSTEMIC VEINS: Inferior vena cava: The vessel is mildly dilated. The respirophasic diameter changes are blunted (less than 50%).   · Repeat Echocardiogram  · IV Lasix  · IV Nitroglycerin  · TRENT on CKD  · [place]  · Trend Labs  · Monitor output  · Atrial Fibrillation  · Appreciate Cardiology  · Continue Eliquis, Toprol XL  · Hypertension  · Continue Losartan, Toprol XL, amlodipine  · DVT prophylaxis: already on chronic anticoagulation  · Peptic ulcer prophylaxis: Pepcid  · High risk medications: Nitro Drip  · Social Service and Case Management consults for DC planning  · Dietician consult initiated  · Disposition:  Discharge plan is [facility] [address]  DVT prophylaxis: already on chronic anticoagulation  Decubitus ulcer present on admission: No  CODE STATUS: DNR-CCA  Nutrition Status: fair   Physical therapy: Yes   Old Charts reviewed: Yes  EKG Reviewed:  Yes  Advance Directive Addressed: Yes    SHELLIE Johnson - CNP, APRN, NP-C  5/5/2022, 4:20 PM

## 2022-05-05 NOTE — PROGRESS NOTES
Patient sitting up in bed eating dinner at this time. Bipap removed and patient placed on 2L nasal cannula to eat. Patient's son remains at bedside.

## 2022-05-05 NOTE — ED NOTES
Contacted Emergency contact #1 to inform him of pt status. Will be coming to ED shortly.      Zechariah Reddy  05/05/22 [id number]

## 2022-05-05 NOTE — PROGRESS NOTES
Critical troponin called at this time, after repeating initial troponin that was called, troponin remains the same. Dr Patsy Barboza called and notified. New orders received. POA remains at the bedside, patient and son both agree not to transfer and would like to remain at [facility]. Patient and POA educated on troponin and what medications are being given at this time.  Patient is having chest pain currently, she will not rate the pain but states its sore in the center of her chest.

## 2022-05-05 NOTE — PROGRESS NOTES
Patient brought to  via cart and was slid over to bed. Patient is A&Ox4. Lung sounds are diminished through out with fine crackles in the bases, denies any current shortness of breath. Bipap in place 35% fio2. Heart rhythm is irregular, afib on the monitor, denies any chest pain. Trace swelling is noted to BLE. Patients skin is pale and slightly jaundice. Denies any current pain. Oriented to room and given call light.

## 2022-05-05 NOTE — ED NOTES
Left Dr. Alexia Tobias a message per Dr. Hennessy Jonh request.      Jeovany Duglas  05/05/22 7719  [facility]        Called  [phone] - phone rang without answer. Will try back later.     Guillaume Del Rio MD  5/5/22 [id number]

## 2022-05-05 NOTE — RT PROTOCOL NOTE
RESPIRATORY ASSESSMENT PROTOCOL                                                                                              Patient Name: Rita Mensah Room#: D107/U387-49 : 9/3/1928     Admitting diagnosis: Acute respiratory failure with hypoxia ([facility] [J96.01]  Acute respiratory failure, unspecified whether with hypoxia or hypercapnia ([facility] [J96.00]       Medical History:   Past Medical History:   Diagnosis Date    Acute kidney injury superimposed on CKD ([facility] 2022    Acute on chronic [facility] failure with hypoxia [facility] 2022    Acute on chronic systolic CHF (congestive heart failure), NYHA class 4 (HCC) 2022    Arthritis     Asthma     Atrial fibrillation (HCC)     CAD (coronary artery disease)     Dehydration 2022    Fibromyalgia     History of stroke     Hyperlipidemia     Hypertension     Hypothyroidism     Left ventricular hypokinesis-severe 2022    MI [facility]myocardial infarction) [facility]     NSTEMI (non-ST elevated myocardial infarction) ([facility] 2022    Respiratory acidosis 2022       PATIENT ASSESSMENT    LABORATORY DATA  Hematology:   Lab Results   Component Value Date    WBC 14.9 2022    RBC 4.34 2022    HGB 13.1 2022    HCT 42.4 2022     2022     Chemistry:    Lab Results   Component Value Date    PHART 7.440 2022    OEM2SIZ 26.2 2022    PO2ART 127.9 2022    U8LJCVHE 98.7 2022    GWZ8COE 17.4 2022       VITALS  Pulse: 131   Resp: 20  BP: (!) 118/93  SpO2: 97 % O2 Device: PAP (positive airway pressure)  Temp: 97.7 °F (36.5 °C)    SKIN COLOR  [] Normal  [x] Pale  [] Dusky  [] Cyanotic    RESPIRATORY PATTERN  [x] Normal  [] Dyspnea  [] Cheyne-Hough  [] Kussmaul  [] Biots    AMBULATORY  [] Yes  [x] No  [x] With Assistance      Patient Acuity 0 1 2 3 4 Score   Level of Concious (LOC) [x]  Alert & Oriented or Pt normal LOC []  Confused;follows directions []  Confused & uncooper-ative []  Obtunded []  Comatose 0   Respiratory Rate  (RR) [x]  Reg. rate & pattern. 12 - 20 bpm  []  Increased RR. Greater than 20 bpm   []  SOB w/ exertion or RR greater than 24 bpm []  Access- ory muscle use at rest. Abn.  resp. []  SOB at rest.   0   Bilateral Breath Sounds (BBS) []  Clear []  Diminish-ed bases  [x]  Diminish-ed t/o, or rales   []  Sporadic, scattered wheezes or rhonchi []  Persistentwheezes and, or absent BBS 2   Cough [x]  Strong, effective, & non-prod. []  Effective & prod. Less than 25 ml (2 TBSP) over past 24 hrs []  Ineffective & non-prod to less than 25 ML over past 24 hrs []  Ineffective and, or greater than 25 ml sputum prod. past 24 hrs. []  Nonspon- taneous; Requires suctioning 0   Pulmonary History  (PULM HX) []  No smoking and no chronic pulmonaryhistory []  Former smoker. Quit over 12 mos. ago []  Current smoker or quit w/ in 12 mos []  Pulm. History and, or 20 pk/yr smoking hx [x]  Admitted w/ acute pulm. dx and, or has been admitted w/ pulm. dx 2 or more times over past 12 mos 4   Surgical History this Admit  (SURG HX) [x]  No surgery []  General surgery []  Lower abdominal []  Thoracic or upper abdominal   []  Thoracic w/ pulm. disease 0   Chest X-Ray (CXR)/CT Scan []  Clear or not applicable []  Not available []  Atelect- asis or pleural effusions [x]  Localized infiltrate or pulm. edema []  Con-solidated Infiltrates, bilateral, or in more than 1 lobe 3   Slow or Forced VC, FEV1 OR PEFR (PULM FXN)  [x]  80% or greater, or not indicated []  Pt. unable to perform []  FEV1 or PEFR or VC 51-79%.   []  FEV1 or PEFR or VC  30-49%   []  FEV1 or PEFR or VC less than 30%   0   TOTAL ACUITY: 9       CARE PLAN    If Acuity Level is 2, 3, or 4 in any of the following:    [] BILATERAL BREATH SOUNDS (BBS)     [] PULMONARY HISTORY (PULM HX)  [x] PULMONARY FUNCTION (PULM FX)    Goal: Improve respiratory functions in patients with airway disease and decrease WOB    [x] AEROSOL PROTOCOL    Total Acuity: 16-32  []  Secondary Assessment in 24 hrs Total Acuity:  9-15  [x]  Secondary Assessment in 24 hrs Total Acuity:  4-8  []  Secondary Assessment in 48 hrs Total Acuity:  0-3  []  Secondary Assessment in 72 hrs   HHN AEROSOL THERAPY with  [physician-ordered bronchodilator(s)] q 4 & Albuterol PRN q2 hrs. Breath-Actuated Neb if BBS Acuity = 4, and pt. can use MP. Notify physician if condition deteriorates. HHN AEROSOL THERAPY with  [physician-ordered bronchodilator(s)]  QID and Albuterol PRN q4 hrs. Breath-Actuated Neb if BBS Acuity = 4, and pt. can use MP. Notify physician if condition deteriorates. MDI THERAPY with  2 actuations of [physician-ordered bronchodilator(s)] via spacer TID Albuterol and PRNq4 hrs. If unable to utilize MDI: HHN [physician-ordered bronchodilator(s)] TID and Albuterol PRN q4 hrs. Notify physician if condition deteriorates. MDI THERAPY with  [physician-ordered bronchodilator(s)] via spacer TID PRN. If unable to utilize MDI: HHN [physician-ordered bronchodilator(s)] TID PRN. Notify physician if condition deteriorates. If Acuity Level is 2, 3, or 4 in any of the following:    [] COUGH     [] SURGICAL HISTORY (SURG HX)  [x] CHEST XRAY (CXR)    Goal: Improvement in sputum mobilization in patients with ineffective airway clearance. Reverse atelectasis.     [] Bronchopulmonary Hygiene Protocol    Total Acuity:   16-32  []  Secondary Assessment in 24 hrs Total Acuity:  9-15  [x]  Secondary Assessment in 24 hrs Total Acuity:  4-8  []  Secondary Assessment in 48 hrs Total Acuity:  0-3  []  Secondary Assessment in 72 hrs   METANEB QID with [physician-ordered bronchodilator(s)] if CXR Acuity = 4; otherwise:  PD&P, PEP, or Vest QID & PRN  NT Sxn PRN for ineffective cough  METANEB QID with [physician-ordered bronchodilator(s)] if CXR Acuity = 4; otherwise:  PD&P, PEP, or Vest TID & PRN  NT Sxn PRN for ineffective cough  Instruct patient to self-perform IS q1hr WA   Directed Cough self-performed q1hr WA     If Acuity Level is 2 or above in the following:    [] PULMONARY HISTORY (PULM HX)    Goal: Assist patient in quitting smoking to slow or stop the progression of lung disease.     [] Smoking Cessation Protocol    SMOKING CESSATION EDUCATION provided according to policy [id number]: (salbador with an X)  ____Yes    ____ No     ____ NA    Smoking Cessation Booklet given:  ____Yes  ____No ____Patient Dale Lowe

## 2022-05-05 NOTE — CONSULTS
Palliative Care Inpatient Evaluation    NAME:  Honorio Palm RECORD NUMBER:  [id number]  AGE: 80 y.o. GENDER: female  : 9/3/1928  TODAY'S DATE:  2022    Reasons for Consultation:    Provision of information regarding PC and/or hospice philosophies  Complex, time-intensive communication and interdisciplinary psychosocial support  Clarification of goals of care and/or assistance with difficult decision-making  Guidance in regards to resources and transition(s)    Code Status:     History of Present Illness     The patient is a 80 y.o. Non- / non  female who presents with Respiratory Distress    Referred to Palliative Care by   [] Physician   [] Nursing  [] Family Request   [x] Other:  CNP     She was admitted to the Hospitalist service for Acute respiratory failure with hypoxia (HCC) [J96.01]  Acute respiratory failure, unspecified whether with hypoxia or hypercapnia ([address] [J96.00]. Her hospital course has been associated with Acute on chronic respiratory failure with hypoxia ([address]  The patient has a complicated medical history and has been hospitalized since 2022  9:49 AM.    Active Hospital Problems    Diagnosis Date Noted    Acute on chronic respiratory failure with hypoxia ([address] [J96.21] 2022     Priority: Medium    Acute on chronic systolic CHF (congestive heart failure), NYHA class 4 ([address] [I50.23] 2022     Priority: Medium    NSTEMI (non-ST elevated myocardial infarction) ([address] [I21.4] 2022     Priority: Medium    Left ventricular hypokinesis-severe [I51.89] 2022     Priority: Medium    Acute kidney injury superimposed on CKD ([address] [N17.9, N18.9] 2022     Priority: Medium    Dehydration [E86.0] 2022     Priority: Medium    Respiratory acidosis [E87.2] 2022     Priority: Medium    CAD (coronary artery disease) [I25.10] 2022    History of stroke [Z86.73] 2022    Hypertension [I10] 2022    Hypothyroidism [E03.9] 03/02/2022    Atrial fibrillation (HCC) [I48.91]        Data        Code Status: DNR-CCA     ADVANCED CARE PLANNING:  Patient has capacity for medical decisions: yes  Health Care Power of : yes  Living Will: yes     Personal, Social, and Family History  Marital Status:   Living situation:nursing home  Psychological Distress: mild  Does patient understand diagnosis/treatment? yes  Does family/caregiver understand diagnosis/treatment? yes    Assessment        Palliative Performance Scale:    ___100% Full ambulation; normal activity and work; no evidence of disease; able to do own self care; normal intake; fully conscious  ___90% Full ambulation; normal activity and work; some evidence of disease; able to do own self care; normal intake; fully conscious  ___80% Full ambulation; normal activity with effort; some evidence of disease; able to do own self care; normal or reduced intake; fully conscious  ___70% Ambulation reduced; unable to perform normal job/work; significant disease; able to do own self care; normal or reduced intake; fully conscious  ___60%  Ambulation reduced; cannot do hobbies/housework; significant disease; occasional assist; intake normal or reduced; fully conscious/some confusion  ___50%  Mainly sit/lie; can't do any work; extensive disease; considerable assist; intake normal or reduced; fully conscious/some confusion  __x_40%  Mainly in bed; extensive disease; mainly assist; intake normal or reduced; fully conscious/ some confusion   ___30%  Bed bound; extensive disease; total care; intake reduced; fully conscious/some confusion  ___20%  Bed bound; extensive disease; total care; intake minimal; drowsy/coma  ___10%  Bed bound; extensive disease; total care; mouth care only; drowsy/coma  ___0       Death       Readmission Risk : 18%      Plan        Palliative Interaction: [place] is a new admission to ICU.  States that she was at the nursing home when she had difficulty breathing. She was recently admitted to [facility] with an MI. She returned to the [facility] for skilled care after that. Her sons are at the bedside. CNP and cardiologist meet with [place] and her family to discuss care. Son states that [place]'s spouse passed away less than a month ago. [place] declines aggressive care and her son states that they are leaning toward hospice to keep her comfortable but would like to discuss further before making a decision. Will follow up tomorrow. Education/support to family  Education/support to patient  Providing support for coping/adaptation/distress of family  Providing support for coping/adaptation/distress of patient  Specific spiritual beliefs/practices  Caregiver support/education  Validating patient/family distress    Principle Problem/Diagnosis:  Acute respiratory failure with hypoxia (HCC) [J96.01]  Acute respiratory failure, unspecified whether with hypoxia or hypercapnia ([address] [J96.00]    Goals of care evaluation:  The patient goals of care are provide comfort care/support/palliation/relieve suffering, preserve independence/autonomy/control and support for family/caregiver   Goals of care discussed with:    [] Patient independently    [x] Patient and Family    [] Family or Healthcare DPOA independently    [] Unable to discuss with patient, family/DPOA not present    Code Status  DNR-CCA    Palliative Care will continue to follow Ms. Meneses's care as needed. Thank you for allowing Palliative Care to participate in the care of Ms. William Rinaldi .     Electronically signed by   Lorelei Connell RN  Palliative Care Team  on 5/5/2022 at 4:28 PM    Palliative care office: [phone]

## 2022-05-05 NOTE — PROGRESS NOTES
Dr Collette Stairs called to clarify medications to be given tonight. New orders received. Updated on no urine output at this time including no output in ER.

## 2022-05-05 NOTE — PROGRESS NOTES
Writer called and verified with Dr. Laura Tomlinson on metoprolol and he would like PO ordered instead of IV at this time.

## 2022-05-06 PROBLEM — J96.00 ACUTE RESPIRATORY FAILURE (HCC): Status: ACTIVE | Noted: 2022-05-05

## 2022-05-06 LAB
ABSOLUTE EOS #: <0.03 K/UL (ref 0–0.44)
ABSOLUTE IMMATURE GRANULOCYTE: 0.04 K/UL (ref 0–0.3)
ABSOLUTE LYMPH #: 1.26 K/UL (ref 1.1–3.7)
ABSOLUTE MONO #: 0.87 K/UL (ref 0.1–1.2)
ANION GAP SERPL CALCULATED.3IONS-SCNC: 18 MMOL/L (ref 9–17)
BASOPHILS # BLD: 0 % (ref 0–2)
BASOPHILS ABSOLUTE: <0.03 K/UL (ref 0–0.2)
BUN BLDV-MCNC: 39 MG/DL (ref 8–23)
BUN/CREAT BLD: 15 (ref 9–20)
CALCIUM SERPL-MCNC: 7.9 MG/DL (ref 8.6–10.4)
CHLORIDE BLD-SCNC: 101 MMOL/L (ref 98–107)
CO2: 20 MMOL/L (ref 20–31)
CREAT SERPL-MCNC: 2.66 MG/DL (ref 0.5–0.9)
EKG ATRIAL RATE: 101 BPM
EKG ATRIAL RATE: 141 BPM
EKG Q-T INTERVAL: 388 MS
EKG Q-T INTERVAL: 418 MS
EKG QRS DURATION: 114 MS
EKG QRS DURATION: 116 MS
EKG QTC CALCULATION (BAZETT): 516 MS
EKG QTC CALCULATION (BAZETT): 517 MS
EKG R AXIS: -65 DEGREES
EKG R AXIS: -72 DEGREES
EKG T AXIS: -113 DEGREES
EKG T AXIS: -142 DEGREES
EKG VENTRICULAR RATE: 107 BPM
EKG VENTRICULAR RATE: 92 BPM
EOSINOPHILS RELATIVE PERCENT: 0 % (ref 1–4)
GFR AFRICAN AMERICAN: 20 ML/MIN
GFR NON-AFRICAN AMERICAN: 17 ML/MIN
GFR SERPL CREATININE-BSD FRML MDRD: ABNORMAL ML/MIN/{1.73_M2}
GFR SERPL CREATININE-BSD FRML MDRD: ABNORMAL ML/MIN/{1.73_M2}
GLUCOSE BLD-MCNC: 126 MG/DL (ref 70–99)
HCT VFR BLD CALC: 34.8 % (ref 36.3–47.1)
HEMOGLOBIN: 11 G/DL (ref 11.9–15.1)
IMMATURE GRANULOCYTES: 0 %
LYMPHOCYTES # BLD: 13 % (ref 24–43)
MAGNESIUM: 2.3 MG/DL (ref 1.6–2.6)
MCH RBC QN AUTO: 29.6 PG (ref 25.2–33.5)
MCHC RBC AUTO-ENTMCNC: 31.6 G/DL (ref 28.4–34.8)
MCV RBC AUTO: 93.8 FL (ref 82.6–102.9)
MONOCYTES # BLD: 9 % (ref 3–12)
NRBC AUTOMATED: 0 PER 100 WBC
PDW BLD-RTO: 16.7 % (ref 11.8–14.4)
PLATELET # BLD: 176 K/UL (ref 138–453)
PMV BLD AUTO: 11.8 FL (ref 8.1–13.5)
POTASSIUM SERPL-SCNC: 3.9 MMOL/L (ref 3.7–5.3)
RBC # BLD: 3.71 M/UL (ref 3.95–5.11)
SARS-COV-2, RAPID: NOT DETECTED
SEG NEUTROPHILS: 78 % (ref 36–65)
SEGMENTED NEUTROPHILS ABSOLUTE COUNT: 7.8 K/UL (ref 1.5–8.1)
SODIUM BLD-SCNC: 139 MMOL/L (ref 135–144)
SPECIMEN DESCRIPTION: NORMAL
THYROXINE, FREE: 1.38 NG/DL (ref 0.93–1.7)
TROPONIN, HIGH SENSITIVITY: 9691 NG/L (ref 0–14)
TSH SERPL DL<=0.05 MIU/L-ACNC: 1.6 UIU/ML (ref 0.3–5)
WBC # BLD: 10 K/UL (ref 3.5–11.3)

## 2022-05-06 PROCEDURE — 2580000003 HC RX 258: Performed by: INTERNAL MEDICINE

## 2022-05-06 PROCEDURE — 84484 ASSAY OF TROPONIN QUANT: CPT

## 2022-05-06 PROCEDURE — 6370000000 HC RX 637 (ALT 250 FOR IP): Performed by: FAMILY MEDICINE

## 2022-05-06 PROCEDURE — 84443 ASSAY THYROID STIM HORMONE: CPT

## 2022-05-06 PROCEDURE — 6360000002 HC RX W HCPCS: Performed by: NURSE PRACTITIONER

## 2022-05-06 PROCEDURE — 2000000000 HC ICU R&B

## 2022-05-06 PROCEDURE — 93005 ELECTROCARDIOGRAM TRACING: CPT | Performed by: NURSE PRACTITIONER

## 2022-05-06 PROCEDURE — 93010 ELECTROCARDIOGRAM REPORT: CPT | Performed by: INTERNAL MEDICINE

## 2022-05-06 PROCEDURE — 87635 SARS-COV-2 COVID-19 AMP PRB: CPT

## 2022-05-06 PROCEDURE — 36415 COLL VENOUS BLD VENIPUNCTURE: CPT

## 2022-05-06 PROCEDURE — 84439 ASSAY OF FREE THYROXINE: CPT

## 2022-05-06 PROCEDURE — 94640 AIRWAY INHALATION TREATMENT: CPT

## 2022-05-06 PROCEDURE — 2700000000 HC OXYGEN THERAPY PER DAY

## 2022-05-06 PROCEDURE — 6370000000 HC RX 637 (ALT 250 FOR IP): Performed by: INTERNAL MEDICINE

## 2022-05-06 PROCEDURE — 85025 COMPLETE CBC W/AUTO DIFF WBC: CPT

## 2022-05-06 PROCEDURE — 93005 ELECTROCARDIOGRAM TRACING: CPT | Performed by: INTERNAL MEDICINE

## 2022-05-06 PROCEDURE — 94761 N-INVAS EAR/PLS OXIMETRY MLT: CPT

## 2022-05-06 PROCEDURE — 83735 ASSAY OF MAGNESIUM: CPT

## 2022-05-06 PROCEDURE — 80048 BASIC METABOLIC PNL TOTAL CA: CPT

## 2022-05-06 PROCEDURE — 94660 CPAP INITIATION&MGMT: CPT

## 2022-05-06 RX ORDER — FAMOTIDINE 20 MG/1
20 TABLET, FILM COATED ORAL EVERY OTHER DAY
Status: DISCONTINUED | OUTPATIENT
Start: 2022-05-06 | End: 2022-05-07 | Stop reason: HOSPADM

## 2022-05-06 RX ORDER — MORPHINE SULFATE 2 MG/ML
2 INJECTION, SOLUTION INTRAMUSCULAR; INTRAVENOUS
Status: DISCONTINUED | OUTPATIENT
Start: 2022-05-06 | End: 2022-05-07 | Stop reason: HOSPADM

## 2022-05-06 RX ORDER — MORPHINE SULFATE 100 MG/5ML
10 SOLUTION ORAL
Qty: 30 ML | Refills: 0 | Status: SHIPPED | OUTPATIENT
Start: 2022-05-06 | End: 2022-05-11

## 2022-05-06 RX ORDER — FUROSEMIDE 20 MG/1
40 TABLET ORAL DAILY
DISCHARGE
Start: 2022-05-06 | End: 2022-05-11

## 2022-05-06 RX ORDER — FAMOTIDINE 40 MG/1
20 TABLET, FILM COATED ORAL EVERY OTHER DAY
Qty: 30 TABLET | Refills: 3 | DISCHARGE
Start: 2022-05-06

## 2022-05-06 RX ORDER — LORAZEPAM 2 MG/ML
0.5 CONCENTRATE ORAL EVERY 8 HOURS PRN
Qty: 30 ML | Refills: 0 | Status: SHIPPED | OUTPATIENT
Start: 2022-05-06 | End: 2022-06-15

## 2022-05-06 RX ORDER — ONDANSETRON 4 MG/1
4 TABLET, ORALLY DISINTEGRATING ORAL EVERY 8 HOURS PRN
DISCHARGE
Start: 2022-05-06

## 2022-05-06 RX ADMIN — METOPROLOL TARTRATE 25 MG: 25 TABLET, FILM COATED ORAL at 07:24

## 2022-05-06 RX ADMIN — FAMOTIDINE 20 MG: 20 TABLET ORAL at 08:41

## 2022-05-06 RX ADMIN — MORPHINE SULFATE 2 MG: 2 INJECTION, SOLUTION INTRAMUSCULAR; INTRAVENOUS at 16:38

## 2022-05-06 RX ADMIN — ASPIRIN 81 MG: 81 TABLET, CHEWABLE ORAL at 08:41

## 2022-05-06 RX ADMIN — ALBUTEROL SULFATE 2 PUFF: 90 AEROSOL, METERED RESPIRATORY (INHALATION) at 20:13

## 2022-05-06 RX ADMIN — MORPHINE SULFATE 2 MG: 2 INJECTION, SOLUTION INTRAMUSCULAR; INTRAVENOUS at 08:46

## 2022-05-06 RX ADMIN — SODIUM CHLORIDE, PRESERVATIVE FREE 10 ML: 5 INJECTION INTRAVENOUS at 08:42

## 2022-05-06 RX ADMIN — DONEPEZIL HYDROCHLORIDE 5 MG: 5 TABLET, FILM COATED ORAL at 20:54

## 2022-05-06 RX ADMIN — APIXABAN 2.5 MG: 2.5 TABLET, FILM COATED ORAL at 08:41

## 2022-05-06 RX ADMIN — FLUTICASONE PROPIONATE 1 SPRAY: 50 SPRAY, METERED NASAL at 20:54

## 2022-05-06 RX ADMIN — APIXABAN 2.5 MG: 2.5 TABLET, FILM COATED ORAL at 20:54

## 2022-05-06 RX ADMIN — MELATONIN 3 MG ORAL TABLET 3 MG: 3 TABLET ORAL at 20:54

## 2022-05-06 RX ADMIN — FLUTICASONE PROPIONATE 1 SPRAY: 50 SPRAY, METERED NASAL at 08:42

## 2022-05-06 RX ADMIN — PANTOPRAZOLE SODIUM 40 MG: 40 TABLET, DELAYED RELEASE ORAL at 07:24

## 2022-05-06 RX ADMIN — LEVOTHYROXINE SODIUM 100 MCG: 100 TABLET ORAL at 07:24

## 2022-05-06 RX ADMIN — ALBUTEROL SULFATE 2 PUFF: 90 AEROSOL, METERED RESPIRATORY (INHALATION) at 10:56

## 2022-05-06 RX ADMIN — METOPROLOL TARTRATE 25 MG: 25 TABLET, FILM COATED ORAL at 17:17

## 2022-05-06 RX ADMIN — MORPHINE SULFATE 2 MG: 2 INJECTION, SOLUTION INTRAMUSCULAR; INTRAVENOUS at 23:13

## 2022-05-06 RX ADMIN — ALBUTEROL SULFATE 2 PUFF: 90 AEROSOL, METERED RESPIRATORY (INHALATION) at 05:48

## 2022-05-06 RX ADMIN — ALBUTEROL SULFATE 2 PUFF: 90 AEROSOL, METERED RESPIRATORY (INHALATION) at 16:26

## 2022-05-06 RX ADMIN — SERTRALINE HYDROCHLORIDE 25 MG: 25 TABLET ORAL at 08:41

## 2022-05-06 RX ADMIN — CLOPIDOGREL BISULFATE 75 MG: 75 TABLET ORAL at 08:41

## 2022-05-06 ASSESSMENT — PAIN DESCRIPTION - ONSET
ONSET: ON-GOING
ONSET: GRADUAL

## 2022-05-06 ASSESSMENT — PAIN DESCRIPTION - ORIENTATION
ORIENTATION: MID
ORIENTATION: RIGHT;LOWER
ORIENTATION: MID

## 2022-05-06 ASSESSMENT — PAIN SCALES - GENERAL
PAINLEVEL_OUTOF10: 0
PAINLEVEL_OUTOF10: 4
PAINLEVEL_OUTOF10: 0
PAINLEVEL_OUTOF10: 7
PAINLEVEL_OUTOF10: 5
PAINLEVEL_OUTOF10: 4
PAINLEVEL_OUTOF10: 4

## 2022-05-06 ASSESSMENT — PAIN DESCRIPTION - FREQUENCY
FREQUENCY: INTERMITTENT
FREQUENCY: CONTINUOUS

## 2022-05-06 ASSESSMENT — PAIN DESCRIPTION - PAIN TYPE
TYPE: ACUTE PAIN
TYPE: ACUTE PAIN

## 2022-05-06 ASSESSMENT — PAIN DESCRIPTION - LOCATION
LOCATION: BACK
LOCATION: BACK
LOCATION: ABDOMEN

## 2022-05-06 ASSESSMENT — PAIN SCALES - WONG BAKER: WONGBAKER_NUMERICALRESPONSE: 0

## 2022-05-06 ASSESSMENT — PAIN DESCRIPTION - DESCRIPTORS
DESCRIPTORS: ACHING

## 2022-05-06 NOTE — PLAN OF CARE
Problem: Discharge Planning  Goal: Discharge to home or other facility with appropriate resources  5/5/2022 2149 by Sonny Roth, RN  Outcome: Progressing  5/5/2022 2148 by Sonny Roth RN  Outcome: Progressing  Flowsheets (Taken 5/5/2022 1823 by Apryl Reagan RN)  Discharge to home or other facility with appropriate resources: Identify barriers to discharge with patient and caregiver

## 2022-05-06 NOTE — PROGRESS NOTES
Pt sitting up in bed with family at bedside. Pt is A&O x4. Vitals and assessment as charted. Pt denies pain. Pt denies any further needs at this time. Call light within reach. Bed alarm on.

## 2022-05-06 NOTE — PROGRESS NOTES
[facility]  Inpatient/Observation/Outpatient Rehabilitation    Date: 2022  Patient Name: Karlyn Burkitt       [] Inpatient Acute/Observation       []  Outpatient  : 9/3/1928       [] Pt no showed for scheduled appointment    [] Pt refused/declined therapy at this time due to:           [x] Pt cancelled due to:  [] No Reason Given   [] Sick/ill   [x] Other:Per RN, patient is not appropriate for therapy at this time; will check back with staff in PM.  Therapist/Assistant will attempt to see this patient, at our earliest opportunity.        VAZQUEZ Dimas/DANIE Date: 2022

## 2022-05-06 NOTE — PROGRESS NOTES
[facility] transport unable to transport patient until midnight. Writer contacted [facility] to see if their transportation could transport patient earlier. [facility] transportation couldn't transport till 3:00PM tomorrow afternoon. [facility] transport cancelled. Writer spoke with patient and patient's sons. Patient declined being transported @ midnight. Patient ok with staying overnight and being transported first thing tomorrow morning. Writer returned call to [facility] and scheduled transport for 8:00 AM tomorrow. Writer also contacted [facility]  and spoke with Jason Recio RN and informed her that patient will be coming tomorrow instead of tonight.

## 2022-05-06 NOTE — PROGRESS NOTES
Spoke with patient and son at bedside. Son states that they have decided not to sign on with hospice because Morena Peters is going to finish her IV medicines and then we can go from there. \" Discussed purpose of the nitro drip. Son states that his brother is the one that needs to make medical decisions as his wife is a surgical nurse and she knows more. Discussed that [place] is able to make her own decisions at this time as she is alert and oriented. [place] is concerned at this time about finding another facility to go to as she does not want to return to the [facility] and states that she wants to sign on with hospice. Provided with the Freedom of choice list and discussed nursing home options. Spoke with son Taryn Lizama via phone. States that he believes that they will do hospice in a few days but feels its too much of a rush to make the changes today. He would like to continue the nitro drip and he will be back at the hospital later today to discuss further.  updated.     [address] and [place]ina Nurse Coordinator  5/6/2022 12:14 PM

## 2022-05-06 NOTE — PROGRESS NOTES
At this time, son has decided not to sign on with [facility]. Hospice nurse gave contact information if they change their mind.

## 2022-05-06 NOTE — PROGRESS NOTES
Patient lying in bed, bipap on. States having tightness in chest. Currently on nitro drip @ 10mcg/min. Asked to eat and was given her meal which she ate 50% of. Drank 100% of her fluids. Will continue to monitor and assist as needed.

## 2022-05-06 NOTE — PROGRESS NOTES
Patient assisted x1 assist to [facility] at this time. Patient had small loose BM. Patient was SOB with exertion. Patient assisted back to bed and placed back on BiPAP. Patient denies any further needs. Call light remains within reach.

## 2022-05-06 NOTE — PROGRESS NOTES
[facility]  Inpatient/Observation/Outpatient Rehabilitation    Date: 2022  Patient Name: Judi Pollard       [x] Inpatient Acute/Observation       []  Outpatient  : 9/3/1928       [x] Pt declined therapy at this time due to: Son and patient requesting to hold all therapy this date due to pt not feeling up to it and son wanting medications to settle in today. Will check with RN and pt on 22. Therapist/Assistant will attempt to see this patient, at our earliest opportunity.        Hien hBagat, PT , DPT, CMPT  Date: 2022

## 2022-05-06 NOTE — PROGRESS NOTES
Discussed discharge plans with the patient and son. Patient is a 80year old female here with  Acute respiratory failure with hypoxia . She is alert , oriented , and pleasant during our conversation. Patient is a  and was a the [facility] assisted living/ECF. She uses a walker , Bi-pap, and oxygen. Patient requires some assistance with her ADL's. The [facility] manages her medication and transportation. Her PCP is Dr. Marbella Nur MD. She has medical insurance that helps with medication costs. The discharge plan is going to be hospice care. Referral has been made to [facility]. She has a DNR CCA. TANVIRW to monitor and assist with any needs or concerns as they arise.     KALIE Grace

## 2022-05-06 NOTE — PROGRESS NOTES
Patient up to commode to have BM. Patient returned to bed with assistance. Patient with dyspnea noted. Patient utilizing accessory muscles. Oxygen on @ 2 L/m via nasal cannula. Writer administered Morphine per PRN order for respiratory distress.

## 2022-05-06 NOTE — PROGRESS NOTES
PALLIATIVE CARE NURSING ASSESSMENT    Patient: Gumaro Bernard  Room: I305/I305-01    Reason For Consult   Goals of care evaluation  Distress management  Guidance and support  Facilitate communications  Assistance in coordinating care    Code Status: DNR-CCA      Impression: Gumaro Bernard is a 80y.o. year old female  has a past medical history of Acute kidney injury superimposed on CKD ([address] Acute on chronic respiratory failure with hypoxia ([address] Acute on chronic systolic CHF (congestive heart failure), NYHA class 4 (HCC), Arthritis, Asthma, Atrial fibrillation ([address] CAD (coronary artery disease), Dehydration, Fibromyalgia, History of stroke, Hyperlipidemia, Hypertension, Hypothyroidism, Left ventricular hypokinesis-severe, MI (myocardial infarction) ([address] NSTEMI (non-ST elevated myocardial infarction) ([address] and Respiratory acidosis. .  Currently hospitalized for the management of above medical issues. The Palliative Care Team is following to assist with patient and family support. Vital Signs  Blood pressure 128/78, pulse 110, temperature 97.9 °F (36.6 °C), temperature source Temporal, resp. rate 23, height 5' 2\" (1.575 m), weight 134 lb 0.6 oz (60.8 kg), SpO2 96 %. Patient Active Problem List   Diagnosis    Atrial fibrillation ([address]    CAD (coronary artery disease)    History of stroke    Hyperlipidemia    Hypertension    Hypothyroidism    Acute respiratory failure (HCC)    Acute on chronic systolic CHF (congestive heart failure), NYHA class 4 ([address]    NSTEMI (non-ST elevated myocardial infarction) ([address]    Left ventricular hypokinesis-severe    Acute kidney injury superimposed on CKD ([address]    Dehydration    Respiratory acidosis       Palliative Interaction: [place] is resting in bed this morning. She states that she is more comfortable than yesterday. Bi-Pap is off at this time. Her son is at the bedside.  They share that her kidney function is worsening and she is not producing much urine, 50 ml output last shift. Troponin last night 115 this morning 9691. Poor appetite. Family shared with [facility] that they would like to speak with [facility]. Nurse and  made contact with hospice agency and they will arrive to speak to [place] and her son at 5. [place] states that she is experiencing some chest \"discomfort\" but denies pain at this time. Nitro drip running. Discussed medications for comfort. States that she used to go to the [facility] but no longer goes there. Son states that a Monalisa Carmona that they know spoke with him yesterday and would like to come and see her today. She verbalizes agreement and states that she has no other spiritual needs at this time. Both deny questions regarding hospice at this time. They state that they utilized [place] [facility] for [place]'s spouse and they were happy with their care. Both deny further needs. [place] states that she is drowsy. Repositioned for comfort. Will follow up if needed.      Goals/Plan of care  Education/support to family  Education/support to patient  Providing support for coping/adaptation/distress of family  Providing support for coping/adaptation/distress of patient  Specific spiritual beliefs/practices  Fear of death or dying  Caregiver support/education  Continue with current plan of care  Pain management for comfort  Medications to decrease non-pain symptoms  Validating patient/family distress  Recognizing, reflecting, and empathizing with the patient's anticipatory grief    Electronically signed by   Mariano Smith RN  Palliative Care Team  on 5/6/2022 at 8:34 AM    Palliative care office: [phone]

## 2022-05-06 NOTE — PROGRESS NOTES
Patient resting in bed with eyes closed. Assessment performed and charted, states chest pain has greatly improved. Remains in Afib rhythm. VS stable with heart rate remaining tachycardic. Will continue to monitor and assess as needed.

## 2022-05-06 NOTE — PROGRESS NOTES
Patient requested [facility], writer contacted [facility], awaiting for nurse to call back.

## 2022-05-06 NOTE — PROGRESS NOTES
Patient stated she could feel a little more pain in her chest at this time. Nitro dripped increased to 15 mcg/min.

## 2022-05-06 NOTE — PROGRESS NOTES
Family is now reconsider hospice and going back to the [facility]. They have not made a finial decision as of yet.   Conception KALIE Greco

## 2022-05-06 NOTE — PROGRESS NOTES
Comprehensive Nutrition Assessment    Type and Reason for Visit:  Initial    Nutrition Recommendations/Plan:   Continue current diet  Monitor weight changes  Pt put on hospice     Malnutrition Assessment:  Malnutrition Status: Moderate malnutrition (05/06/22 0916)    Context:  Acute Illness     Findings of the 6 clinical characteristics of malnutrition:  Energy Intake:  75% or less of estimated energy requirements for 7 or more days  Weight Loss:  No significant weight loss     Body Fat Loss:  Mild body fat loss Triceps,Orbital   Muscle Mass Loss:  Mild muscle mass loss Temples (temporalis),Clavicles (pectoralis & deltoids),Hand (interosseous)  Fluid Accumulation:  No significant fluid accumulation     Strength:  Not Performed    Nutrition Assessment:    Moderate malnutrition r/t, impaired respiratory function AEB, poor PO prior to admission, PO 26-50%. Pt PO has been low, family says that she nibbles on things throughout the day and tries to eat something at each meal. No N/V or chewing/swallowing difficulties at this time. Pt is being put on hospice. Current abnormal labs are BUN at 39, Creatinine 2.66, and GFR 17.    mild s/sx of malnutrtion Wound Type: None       Current Nutrition Intake & Therapies:    Average Meal Intake: 26-50%  Average Supplements Intake: None Ordered  ADULT DIET; Regular; Low Sodium (2 gm)    Anthropometric Measures:  Height: 5' 2\" (157.5 cm)  Ideal Body Weight (IBW): 110 lbs (50 kg)    Admission Body Weight: 134 lb 0.6 oz (60.8 kg)  Current Body Weight: 134 lb 0.6 oz (60.8 kg), 121.9 % IBW.  Weight Source: Bed Scale  Current BMI (kg/m2): 24.5  Usual Body Weight: 136 lb (61.7 kg) (03/02/22)  % Weight Change (Calculated): -1.4  Weight Adjustment For: No Adjustment                 BMI Categories: Normal Weight (BMI 22.0 to 24.9) age over 72    Estimated Daily Nutrient Needs:  Energy Requirements Based On: Kcal/kg  Weight Used for Energy Requirements: Current  Energy (kcal/day): 0651-5235 (27-30)  Weight Used for Protein Requirements: Current  Protein (g/day): 73-85 (1.2-1.4)  Method Used for Fluid Requirements: 1 ml/kcal  Fluid (ml/day): 9840    Nutrition Diagnosis:   Moderate malnutrition related to impaired respiratory function as evidenced by poor intake prior to admission,intake 26-50%    Recent Labs     05/05/22  0950 05/06/22  0515    139   K 3.3* 3.9    101   CO2 16* 20   BUN 27* 39*   CREATININE 1.97* 2.66*   GLUCOSE 260* 126*   ALT 7  --    ALKPHOS 107*  --    GFR                            Lab Results   Component Value Date    LABALBU 4.0 05/05/2022    LABALBU 4.1 03/17/2012      Nutrition Interventions:   Food and/or Nutrient Delivery: Continue Current Diet  Nutrition Education/Counseling: No recommendation at this time  Coordination of Nutrition Care: Continue to monitor while inpatient  Plan of Care discussed with: Pt and family    Goals:  Previous Goal Met: Progressing toward Goal(s)  Goals: PO intake 75% or greater       Nutrition Monitoring and Evaluation:   Behavioral-Environmental Outcomes: None Identified  Food/Nutrient Intake Outcomes: Food and Nutrient Intake  Physical Signs/Symptoms Outcomes: Biochemical Data,Nutrition Focused Physical Findings    Discharge Planning:    Continue current diet     Beat[address] 62: [id number]

## 2022-05-06 NOTE — PLAN OF CARE
Transport cannot be here until midnight. Informed Dr Tejinder Covarrubias and okay to stay the night due to no transport.

## 2022-05-06 NOTE — PROGRESS NOTES
[facility]  Inpatient/Observation/Outpatient Rehabilitation    Date: 2022  Patient Name: Nida Mason       [x] Inpatient Acute/Observation       []  Outpatient  : 9/3/1928       [] Pt no showed for scheduled appointment    [x] Pt refused/declined therapy at this time due to: Per RN, okay to check with patient in regards to participation in therapy evaluation this date. Conversation held with patient and patient son at this time. Patient reports she is not feeling up to any therapy today. Son also requested to allow medications to settle in today and check back tomorrow if appropriate. Discussed role of therapy in acute care hospital to patient as she had questions on what therapy typically entails. Therapy will check in with RN/patient tomorrow if medically appropriate. [] Pt cancelled due to:  [] No Reason Given   [] Sick/ill   [] Other:    Therapist/Assistant will attempt to see this patient, at our earliest opportunity.        Anjum Gregorio, OTR/L Date: 2022

## 2022-05-06 NOTE — PLAN OF CARE
Problem: Pain  Goal: Verbalizes/displays adequate comfort level or baseline comfort level  Outcome: Not Progressing  Note: Patient continues with tightness in chest, adjusting nitro drip accordingly. Problem: Safety - Adult  Goal: Free from fall injury  Outcome: Progressing  Flowsheets (Taken 5/5/2022 2144)  Free From Fall Injury: Based on caregiver fall risk screen, instruct family/caregiver to ask for assistance with transferring infant if caregiver noted to have fall risk factors  Note: Patient has been 1 assisted by nursing staff with transfers to bedside commode. Problem: ABCDS Injury Assessment  Goal: Absence of physical injury  Outcome: Progressing  Flowsheets (Taken 5/5/2022 2144)  Absence of Physical Injury: Implement safety measures based on patient assessment  Note: Patient has remained free of injury this shift. Bed alarm on.

## 2022-05-06 NOTE — DISCHARGE INSTR - COC
Continuity of Care Form    Patient Name: Nida Mason   :  9/3/1928  MRN:  [id number]    [facility] date:  2022  Discharge date:  2022    Code Status Order: DNR-CCA   Advance Directives:      Admitting Physician:  Adilene Parkinson MD  PCP: Adilene Parkinson MD    Discharging Nurse: Christine Fernandes RN  Discharging Hospital Unit/Room#: I305/I305-01  Discharging Unit Phone Number: [phone]    Emergency Contact:   Extended Emergency Contact Information  Primary Emergency Contact: [address], [address] [place] [address] Phone: [phone]  Relation: Child  Secondary Emergency Contact: Glenroy Rousseau [address] Phone: [phone]  Relation: Child    Past Surgical History:  Past Surgical History:   Procedure Laterality Date    APPENDECTOMY      CHOLECYSTECTOMY      HYSTERECTOMY      OTHER SURGICAL HISTORY Left 5/26/15    lumbar OZZIE    TONSILLECTOMY         Immunization History:   Immunization History   Administered Date(s) Administered    COVID-19, Pfizer Purple top, DILUTE for use, 12+ yrs, 30mcg/0.3mL dose 2021, 2021, 2021       Active Problems:  Patient Active Problem List   Diagnosis Code    Atrial fibrillation ([address] I48.91    CAD (coronary artery disease) I25.10    History of stroke Z86.73    Hyperlipidemia E78.5    Hypertension I10    Hypothyroidism E03.9    Acute respiratory failure ([address] J96.00    Acute on chronic systolic CHF (congestive heart failure), NYHA class 4 ([address] I50.23    NSTEMI (non-ST elevated myocardial infarction) ([address] I21.4    Left ventricular hypokinesis-severe I51.89    Acute kidney injury superimposed on CKD ([address] N17.9, N18.9    Dehydration E86.0    Respiratory acidosis E87.2       Isolation/Infection:   Isolation            No Isolation          Patient Infection Status       Infection Onset Added Last Indicated Last Indicated By Review Planned Expiration Resolved Resolved By    None active    Resolved    COVID-19 (Rule Out) 22 COVID-19, Rapid (Ordered)   02/07/22 Rule-Out Test Resulted            Nurse Assessment:  Last Vital Signs: /81   Pulse 102   Temp 98.8 °F (37.1 °C) (Temporal)   Resp 16   Ht 5' 2\" (1.575 m)   Wt 134 lb 0.6 oz (60.8 kg)   SpO2 96%   BMI 24.52 kg/m²     Last documented pain score (0-10 scale): Pain Level: 4  Last Weight:   Wt Readings from Last 1 Encounters:   05/06/22 134 lb 0.6 oz (60.8 kg)     Mental Status:  oriented and alert    IV Access:  - None    Nursing Mobility/ADLs:  Walking   Assisted  Transfer  Assisted  Bathing  Assisted  Dressing  Assisted  Toileting  Assisted  Feeding  Independent  Med Admin  Assisted  Med Delivery   whole    Wound Care Documentation and Therapy:        Elimination:  Continence: Bowel: Yes  Bladder: villegas   Urinary Catheter: Insertion Date: 5/6/22 and Indication for Use of Catheter: Hospice/comfort/palliateive care   Colostomy/Ileostomy/Ileal Conduit: No       Date of Last BM: 5/6/22    Intake/Output Summary (Last 24 hours) at 5/6/2022 1411  Last data filed at 5/6/2022 1404  Gross per 24 hour   Intake 1230 ml   Output 85 ml   Net 1145 ml     I/O last 3 completed shifts: In: 073 [P.O.:370; I.V.:274]  Out: 50 [Urine:50]    Safety Concerns: At Risk for Falls    Impairments/Disabilities:      Vision    Nutrition Therapy:  Current Nutrition Therapy:   - Oral Diet:  Low Sodium (2gm)    Routes of Feeding: Oral  Liquids: No Restrictions  Daily Fluid Restriction: no  Last Modified Barium Swallow with Video (Video Swallowing Test): not done    Treatments at the Time of Hospital Discharge:   Respiratory Treatments: Albuterol sulfate inhaler 4 times daily. See MAR for recent administration  Oxygen Therapy:  is on oxygen at 3 L/min per nasal cannula.   Ventilator:    - No ventilator support    Rehab Therapies:   Weight Bearing Status/Restrictions: No weight bearing restrictions  Other Medical Equipment (for information only, NOT a DME order):  wheelchair, bedside commode, and hospital bed  Other Treatments: none    Patient's personal belongings (please select all that are sent with patient):  Glasses    RN SIGNATURE:  Electronically signed by Falguni Falcon RN on 5/6/22 at 2:58 PM EDT    CASE MANAGEMENT/SOCIAL WORK SECTION    Inpatient Status Date: 5/5/22    Readmission Risk Assessment Score:  Readmission Risk              Risk of Unplanned Readmission:  30           Discharging to Facility/ Agency   Name: [address] [place] [address] [place]  [facility]   Name:  Address:  Dialysis Schedule:  Phone:  Fax:    / signature: Electronically signed by KALIE Tomas on 5/6/22 at 2:10 PM EDT    PHYSICIAN SECTION    Prognosis: Poor    Condition at Discharge: Terminal    Rehab Potential (if transferring to Rehab): Poor    Recommended Labs or Other Treatments After Discharge: [facility]    Physician Certification: I certify the above information and transfer of Charlie Aguilar  is necessary for the continuing treatment of the diagnosis listed and that she requires Intermediate Nursing Care / hospice for greater 30 days.      Update Admission H&P: No change in H&P    PHYSICIAN SIGNATURE:  Electronically signed by SHELLIE Oliva CNP on 5/6/22 at 2:27 PM EDT

## 2022-05-06 NOTE — PROGRESS NOTES
[facility]  Inpatient/Observation/Outpatient Rehabilitation    Date: 2022  Patient Name: General Olivo       [x] Inpatient Acute/Observation       []  Outpatient  : 9/3/1928       [] Pt no showed for scheduled appointment    [] Pt refused/declined therapy at this time due to:           [x] Pt cancelled due to:  [] No Reason Given   [] Sick/ill   [] Other:per nurse pt is not appropriate at this time   Therapist/Assistant will attempt to see this patient, at our earliest opportunity.        Yandy Saba, PT Date: 2022

## 2022-05-06 NOTE — PROGRESS NOTES
Lynn Holguin from [facility] @ bedside speaking with patient and patient's sons.

## 2022-05-06 NOTE — PROGRESS NOTES
INTENSIVE CARE UNIT   APRN - Progress Note    Patient - Tamica Thompson  Date of Admission -  5/5/2022  9:49 AM  Date of Evaluation -  5/6/2022  Hospital Day - 1      SUBJECTIVE:     The Tamica Thompson is a 80 y.o. female who is seen for follow up in the ICU. Per nursing report and notes, overnight events include: no significant events. She resting in bed with son at side. I did review her labs and current condition with both her and her son. She and he understand. Due to the acute renal failure worsening and only 50 ml of urine out since yesterday and elevating Troponin with known Echo results from April, at this time they request [facility]. She stated she does not want to return to the [facility]. ROS:   Constitutional: negative  for fevers, and negative for chills. Respiratory: negative for shortness of breath, negative for cough, and negative for wheezing  Cardiovascular: positive for chest pain, and negative for palpitations  Gastrointestinal: negative for abdominal pain, negative for nausea,negative for vomiting, negative for diarrhea, and negative for constipation    All other systems were reviewed with the patient and are negative unless otherwise stated in HPI.       OBJECTIVE:     VITAL SIGNS:  Patient Vitals for the past 8 hrs:   BP Temp Temp src Pulse Resp SpO2 Height Weight   05/06/22 0630 119/74 -- -- 107 18 96 % -- --   05/06/22 0626 132/87 97.9 °F (36.6 °C) Temporal 98 15 95 % -- --   05/06/22 0615 122/88 -- -- 108 12 99 % -- --   05/06/22 0600 (!) 129/93 -- -- 113 15 97 % -- --   05/06/22 0547 123/87 -- -- 102 22 98 % -- --   05/06/22 0545 (!) 142/113 -- -- 99 15 95 % -- --   05/06/22 0530 (!) 125/103 -- -- 96 17 95 % -- --   05/06/22 0515 130/88 -- -- 96 23 98 % -- --   05/06/22 0500 114/70 -- -- 94 15 95 % 5' 2\" (1.575 m) 134 lb 0.6 oz (60.8 kg)   05/06/22 0445 114/75 -- -- 119 16 94 % -- --   05/06/22 0430 100/65 -- -- 102 15 94 % -- --   05/06/22 0415 96/63 -- -- 102 15 94 % -- --   22 0400 111/80 -- -- 115 15 97 % -- --   22 0345 (!) 124/98 -- -- 116 17 98 % -- --   22 0330 129/86 -- -- 111 15 -- -- --   22 0315 (!) 127/93 -- -- 100 17 -- -- --   22 0300 116/75 96.8 °F (36 °C) Temporal 91 16 96 % -- --   22 0245 118/70 -- -- 103 18 95 % -- --   22 0230 (!) 117/93 -- -- 92 21 96 % -- --   22 0215 115/76 -- -- 106 29 97 % -- --   22 0200 122/86 -- -- 93 11 96 % -- --   22 0145 106/74 -- -- 107 13 96 % -- --   22 0130 118/83 -- -- 103 30 95 % -- --   22 0115 126/87 -- -- 100 22 98 % -- --   22 0100 113/81 -- -- 101 20 96 % -- --   22 0045 126/86 -- -- 92 23 97 % -- --   22 0030 110/65 -- -- 86 19 96 % -- --   22 0015 110/71 -- -- 95 23 95 % -- --   22 0000 119/73 -- -- 100 23 95 % -- --   22 2345 112/69 -- -- 104 20 95 % -- --   22 2330 111/69 -- -- 90 30 96 % -- --   22 2315 109/76 -- -- 103 (!) 32 97 % -- --         Temp: 97.9 °F (36.6 °C)  Temp range:    Temp  Av.4 °F (36.3 °C)  Min: 96.6 °F (35.9 °C)  Max: 97.9 °F (36.6 °C)    BP: 119/74  BP Range:      Systolic (39JXX), OJC:286 , Min:96 , VTW:415      Diastolic (55KZX), PTW:65, Min:58, Max:113    Pulse: 107  Pulse Range:    Pulse  Av.7  Min: 86  Max: 131    Resp: 18  Resp Range:   Resp  Av.8  Min: 11  Max: 32    SpO2: 96 % on supplemental O2  SpO2 range:   SpO2  Av %  Min: 90 %  Max: 100 %    Weight  Wt Readings from Last 3 Encounters:   22 134 lb 0.6 oz (60.8 kg)   22 132 lb (59.9 kg)   22 136 lb (61.7 kg)     Body mass index is 24.52 kg/m². 24HR INTAKE/OUTPUT:      Intake/Output Summary (Last 24 hours) at 2022 0703  Last data filed at 2022 0500  Gross per 24 hour   Intake 644 ml   Output 50 ml   Net 594 ml     Date 22 0000 - 22 2359   Shift 9117-9875 5902-7276 8782-9696 24 Hour Total   INTAKE   P.O. 250   250   I. V.(mL/kg/hr) 136   136   Shift Total(mL/kg) 386(6.3)   386(6.3)   OUTPUT   Urine(mL/kg/hr) 50   50   Shift Total(mL/kg) 50(0.8)   50(0.8)   Weight (kg) 60.8 60.8 60.8 60.8         PHYSICAL EXAM:  GEN:    Awake and following commands:     [] No   [x] Yes  MENTAL STATUS: alert and oriented x3. DISTRESS: Acute respiratory distress:       [x] No   [] Yes  EYES:  EOMI, pupils equal   NECK: Supple. No lymphadenopathy. No carotid bruit  CVS:    irregularly irregular, no audible murmur  PULM:  diminished but clear without wheezing, rales or rhonchi, no acute respiratory distress  ABD:    Bowels sounds normal.  Abdomen is soft. No distention. no tenderness to palpation. EXT:   no edema bilaterally . No calf tenderness. NEURO: Moves all extremities. Motor and sensory are grossly intact  SKIN:  No rashes. No skin lesions.           MEDICATIONS:  Scheduled Meds:   [Held by provider] amLODIPine  5 mg Oral Daily    donepezil  5 mg Oral Nightly    famotidine  20 mg Oral Daily    fluticasone  1 spray Each Nostril BID    levothyroxine  100 mcg Oral Daily    [Held by provider] losartan  50 mg Oral Daily    [Held by provider] metoprolol succinate  50 mg Oral Daily    pantoprazole  40 mg Oral QAM AC    sertraline  25 mg Oral Daily    sodium chloride flush  5-40 mL IntraVENous 2 times per day    [Held by provider] furosemide  40 mg IntraVENous BID    melatonin  3 mg Oral Nightly    albuterol sulfate HFA  2 puff Inhalation 4x daily    clopidogrel  75 mg Oral Daily    aspirin  81 mg Oral Daily    apixaban  2.5 mg Oral BID     Continuous Infusions:   sodium chloride      nitroGLYCERIN 5 mcg/min (05/05/22 2202)     PRN Meds:   perflutren lipid microspheres, 1.5 mL, ONCE PRN  sodium chloride flush, 5-40 mL, PRN  sodium chloride, , PRN  ondansetron, 4 mg, Q8H PRN   Or  ondansetron, 4 mg, Q6H PRN  acetaminophen, 650 mg, Q6H PRN   Or  acetaminophen, 650 mg, Q6H PRN  polyethylene glycol, 17 g, Daily PRN  albuterol sulfate HFA, 2 puff, Q6H PRN  fentanNYL, 25 mcg, Q1H PRN  metoprolol tartrate, 25 mg, Q6H PRN            VASOPRESSORS:    [x] No      [] Yes  [] Levophed      [] Dopamine     [] Vasopressin      [] Dobutamine     [] Phenylephrine     [] Epinephrine        DATA:  Complete Blood Count:   Recent Labs     05/05/22  0950 05/06/22  0515   WBC 14.9* 10.0   RBC 4.34 3.71*   HGB 13.1 11.0*   HCT 42.4 34.8*   MCV 97.7 93.8   RDW 16.7* 16.7*    176   SEGS 56 78*   NEUTROABS 8.34* 7.80   LYMPHOPCT 32 13*   LYMPHSABS 4.77* 1.26   MONOPCT 12 9   EOSRELPCT 0* 0*   BASOPCT 0 0   IMMGRAN 0 0        Recent Blood Glucose:   Recent Labs     05/05/22  0950 05/06/22  0515   GLUCOSE 260* 126*        Comprehensive Metabolic Profile:   Recent Labs     05/05/22  0950 05/06/22  0515   BUN 27* 39*   CREATININE 1.97* 2.66*    139   K 3.3* 3.9    101   MG 2.5 2.3   CALCIUM 8.4* 7.9*   ANIONGAP 25* 18*   CO2 16* 20   PROT 6.8  --    LABALBU 4.0  --    BILITOT 1.00  --    ALKPHOS 107*  --    AST 13  --    ALT 7  --         Urinalysis:   Lab Results   Component Value Date    NITRU NEGATIVE 05/05/2022    COLORU Yellow 05/05/2022    PHUR 6.0 05/05/2022    WBCUA 5 TO 10 05/05/2022    RBCUA None 05/05/2022    MUCUS TRACE 02/07/2022    TRICHOMONAS NOT REPORTED 02/07/2022    YEAST NOT REPORTED 02/07/2022    BACTERIA 1+ 05/05/2022    SPECGRAV >1.030 05/05/2022    LEUKOCYTESUR NEGATIVE 05/05/2022    UROBILINOGEN Normal 05/05/2022    BILIRUBINUR NEGATIVE 05/05/2022    GLUCOSEU NEGATIVE 05/05/2022    KETUA NEGATIVE 05/05/2022    AMORPHOUS TRACE 05/05/2022       HgBA1c:  No results found for: LABA1C    TSH:    Lab Results   Component Value Date    TSH 1.60 05/06/2022       Lactic Acid: No results found for: LACTA     High Sensitivity Troponin:  Recent Labs     05/05/22  0950 05/05/22  1618 05/06/22  0515   TROPHS 115* 8,643* 9,691*     Pro-BNP:  Lab Results   Component Value Date    PROBNP >62,000 (H) 05/05/2022     D-Dimer:No results found for: DDIMER  PT/INR: Lab Results   Component Value Date    PROTIME 30.2 03/28/2013    INR 2.8 03/28/2013     PTT:  No results found for: APTT, PTT    CRP: No results for input(s): CRP in the last 72 hours. ABGs:   Lab Results   Component Value Date    PHART 7.440 05/05/2022    XNJ7MJE 26.2 05/05/2022    PO2ART 127.9 05/05/2022    DJR5XKU 17.4 05/05/2022    T8AXJHVO 98.7 05/05/2022    FIO2 35 05/05/2022         Radiology/Imaging:  XR CHEST PORTABLE   Final Result      Probable CHF               ASSESSMENT / PLAN:     Acute respiratory failure (HCC)  · Continue current therapy  · Consults: Dr. James Ruiz  ? Repeat ABG-improved  ? Trend labs  · Acute on Chronic Systolic CHF, class 4  ? Echocardiogram on 4/2022--[facility]--FINDINGS   LEFT VENTRICLE: The cavity size is normal. Wall thickness is mildly   increased. The estimated ejection fraction is 15-20%. Severe diffuse hypokinesis. Regional wall motion abnormalities: Akinesis of the entireanteroseptal and apical myocardium. LEFT ATRIUM: The atrium is markedly dilated. RIGHT VENTRICLE: The cavity size is normal. Wall thickness is normal. Systolic function is normal. RIGHT ATRIUM: The atrium is moderately dilated. MITRAL VALVE: Mildly calcified annulus. Normal (thickness) leaflets. Leaflet motion is normal. Doppler: Transvalvular velocity is within the normal range. There is no evidence for stenosis. There is mild regurgitation. AORTIC VALVE: Trileaflet; moderately thickened leaflets. Doppler: There is no stenosis. There is mild regurgitation. TRICUSPID VALVE: Structurally normal valve. Normal thickness leaflets. Leaflet motion is normal. Doppler: Transvalvular velocity is within the normal range. There is no evidence for stenosis. There is mild regurgitation. PULMONIC VALVE: Leaflets not well visualized. Doppler: There is trivial regurgitation. AORTA: Aortic root: The aortic root is normal. PERICARDIUM: There is no pericardial effusion. SYSTEMIC VEINS: Inferior vena cava:  The vessel is mildly dilated. The respirophasic diameter changes are blunted (less than 50%). ? Repeat Echocardiogram  ? IV Lasix-on hold  ? IV Nitroglycerin  ? Troponin - continues to significantly elevate  ? Repeat EKG today   ? Consult Hospice  · TRENT on CKD  ? [place]  ? Trend Labs-worsening  ? Monitor output  · Atrial Fibrillation  ? Appreciate Cardiology  ? Continue Eliquis, Toprol XL  · Hypertension  ? Hold Losartan, Toprol XL, amlodipine  · Nutrition status:   · at risk for malnutrition  · Dietician consult initiated  · [] NPO [] TPN      [] Tube feed [] Clear liquid        [] Full liquid [x] Cardiac diet         [] Fluid restriction   [] Diabetic diet   · ICU Prophylaxis:   · DVT: Eliquis   · Stress Ulcer: H2 Blocker   · High risk medications: Nitroglycerin Drip   · Disposition:    · Transfer out of ICU:  [] yes [x] no   · Discharge plan is pending  · Total critical care time caring for this patient with life threatening, unstable organ failure, including direct patient contact, management of life support systems, review of data including imaging and labs, discussions with other team members and physicians at least 0 minutes so far today, excluding separately billable procedures.       Ginny Nur, APRN - CNP , APRN-NP-C  5/6/2022  7:03 AM

## 2022-05-06 NOTE — DISCHARGE SUMMARY
Discharge Summary    Merline Leaven  :  9/3/1928  MRN:  [id number]    Admit date:  2022      Discharge date: 2022     Admitting Physician:  Mira Thompson MD    Discharge Diagnoses:    Principal Problem:    Acute respiratory failure [facility])  Active Problems:    Acute on chronic systolic CHF (congestive heart failure), NYHA class 4 ([facility] [address]    NSTEMI (non-ST elevated myocardial infarction) ([facility] [address]    Left ventricular hypokinesis-severe    Acute kidney injury superimposed on CKD ([facility] [address]    Dehydration    Respiratory acidosis    Atrial fibrillation ([facility] [address]    CAD (coronary artery disease)    History of stroke    Hypertension    Hypothyroidism  Resolved Problems:    * No resolved hospital problems. [facility] Course:   Merline Leaven is a 80 y.o. female admitted with respiratory failure. She presented to the emergency room from her [facility] with an acute respiratory failure. The staff reported that she was short of breath in the morning. When EMS arrived they stated the patient was alert and able to speak to them. Upon arrival to the emergency room patient was unresponsive and required bag valve respirations. Patient did have a pulse however was pale and cool. Patient is a DNR CCA as paperwork was provided by [facility] specifically the patient wanted no intubation. She did recover and became more alert. She is alert and oriented. She reported MI several years ago and had another  and was transported to [facility]. She denied any stents or CABG. She stated her  passed away 2 days prior to the MI. She stated she has been in the [facility] since February. She reported \"severe\" SOB and Chest discomfort with diaphoresis and \"hurt all over and into her legs\". She stated she does have history of Fibromyalgia. She was found to have Metabolic Acidosis with elevated Troponin and elevated BNP with Acute renal failure. Stated she has had decrease appetite and fluid intake.   During her admission cardiology was consulted and patient was initiated on nitroglycerin drip to control chest discomfort and CHF. Patient tolerated this well. Although initiated with Lasix in the emergency room patient had 50 mL of urine put out. Patient's labs were trended including her troponin and renal function. Troponin today was greater than 9000 BUN and creatinine continued to elevate despite efforts. Patient was initiated on fentanyl for pain and her losartan and oral metoprolol was held. Lasix was held after initial dose as it was believed that the patient had flash pulmonary edema secondary to an acute non-STEMI which was consistent with her chest x-ray elevated BNP as well as troponin. Patient continues to be very weak. In reviewing her echocardiogram that was completed at [facility] in April 2022 patient's EF was 10 to 15% with severe hypokinesis. Patient's breathing has improved and is currently off BiPAP and is tolerating nasal cannula well. Conversations took place with the patient as well as her sons regarding the concern of elevating troponin and worsening renal function resulting in no urine output and the outcome to this is terminal as they wish to have no intervention done at this time. The patient and family were very understanding to this and took time to think about it and met with [facility] at their request.  Patient made a decision along with her sons to return back to the [facility] today with [facility] hospice for comfort care only. Consultants:  Dr. Ozzie Bradley, cardiology    Procedures: none    Complications: Renal failure    Discharge Condition: Terminal    Exam:  GEN:    Awake and following commands:      []? No               [x]? Yes  MENTAL STATUS: alert and oriented x3. DISTRESS: Acute respiratory distress:          [x]? No               []? Yes  EYES:   EOMI, pupils equal   NECK: Supple. No lymphadenopathy.   No carotid bruit  CVS:     irregularly irregular, no audible murmur  PULM:  diminished but clear without wheezing, rales or rhonchi, no acute respiratory distress  ABD:     Bowels sounds normal.  Abdomen is soft. No distention. no tenderness to palpation. EXT:     no edema bilaterally . No calf tenderness. NEURO: Moves all extremities. Motor and sensory are grossly intact  SKIN:    No rashes. No skin lesions.     Significant Diagnostic Studies:   Lab Results   Component Value Date    WBC 10.0 05/06/2022    HGB 11.0 (L) 05/06/2022     05/06/2022       Lab Results   Component Value Date    BUN 39 (H) 05/06/2022    CREATININE 2.66 (H) 05/06/2022     05/06/2022    K 3.9 05/06/2022    CALCIUM 7.9 (L) 05/06/2022     05/06/2022    CO2 20 05/06/2022    LABGLOM 17 (L) 05/06/2022       Lab Results   Component Value Date    WBCUA 5 TO 10 05/05/2022    RBCUA None 05/05/2022    EPITHUA 0 TO 2 05/05/2022    LEUKOCYTESUR NEGATIVE 05/05/2022    SPECGRAV >1.030 (H) 05/05/2022    GLUCOSEU NEGATIVE 05/05/2022    KETUA NEGATIVE 05/05/2022    PROTEINU 2+ (A) 05/05/2022    HGBUR NEGATIVE 05/05/2022    CASTUA 0 TO 2 FINE GRANULAR 05/05/2022    CRYSTUA NOT REPORTED 02/07/2022    BACTERIA 1+ (A) 05/05/2022    YEAST NOT REPORTED 02/07/2022       Echo Complete    Result Date: 5/5/2022  [address] [place] [address] Transthoracic Echocardiography Report (TTE)  Patient Name Felton Cooper Fitzeal     Date of Study           05/05/2022               [facility]   Date of      09/03/1928  Gender                  Female  Birth   Age          80 year(s)  Race                       Room Number  I305        Height:                 62 inch, 157.48 cm   Corporate ID [id number]    Weight:                 132 pounds, 59.9 kg  #   Patient Acct [de-identified]   BSA:        1.6 m^2     BMI:         24.14 kg/m^2  #   MR #         [id number]      Milton Mantilla   Accession #  [id number]  Interpreting Physician  Brennan Fortune   Fellow                   Referring Nurse                           Practitioner   Interpreting Referring Physician     Brit Brady,  Fellow                                           ALEJANDROFIN *  Type of Study   TTE procedure:2D Echocardiogram, M-Mode, Doppler, Color Doppler. Procedure Date Date: 05/05/2022 Start: 04:41 PM Study Location: [facility] Indications:Congestive heart failure. History / Tech. Comments: CHF PMHX: A fib, CAD, HLD, HTN, MI Patient Status: Inpatient Height: 62 inches Weight: 132 pounds BSA: 1.6 m^2 BMI: 24.14 kg/m^2 BP: 135/93 mmHg CONCLUSIONS Summary Global left ventricular systolic function appears severely reduced with an estimated ejection fraction of 20-25%. The left ventricular cavity size is within normal limits and the left ventricular wall thickness is mildly increased. Severe global hypokinesis superimposed on segmental wall motion abnormalities. Bi-atrial enlargement. Aortic leaflets show calcification without restriction of motion. Mild to Moderate aortic insufficiency. Moderate to severe mitral regurgitation. Moderate tricuspid regurgitation. Moderate pulmonary hypertension with an estimated right ventricular systolic pressure of 52 mmHg. Diastolic function can not be adequately assessed due to atrial fibrillation. Signature ----------------------------------------------------------------------------  Electronically signed by Daria Mireles(Sonographer) on 05/05/2022 05:26  PM ---------------------------------------------------------------------------- ----------------------------------------------------------------------------  Electronically signed by Brennan Fortune(Interpreting physician) on 05/05/2022  05:50 PM ---------------------------------------------------------------------------- FINDINGS Left Atrium The left atrium is severely dilated (>40) with a left atrial volume index of 49 ml/m2. Left Ventricle Global left ventricular systolic function appears severely reduced with an estimated ejection fraction of 20-25%.  The left ventricular cavity size is within normal limits and the left ventricular wall thickness is mildly increased. Severe global hypokinesis superimposed on segmental wall motion abnormalities. Right Atrium The right atrium appears moderately dilated. Right Ventricle Normal right ventricular size and function. Mitral Valve Mitral annular calcification is seen. Moderate to severe mitral regurgitation. Aortic Valve Aortic leaflets show calcification without restriction of motion. Mild to Moderate aortic insufficiency. Tricuspid Valve Moderate tricuspid regurgitation. Moderate pulmonary hypertension with an estimated right ventricular systolic pressure of 52 mmHg. Pulmonic Valve Normal pulmonic valve structure with mild pulmonic regurgitation. Pericardial Effusion No pericardial effusion seen. Miscellaneous IVC Increased diameter and impaired or no inspiratory variation indicating elevated RA filling pressure (i.e. CVP) . Diastolic function can not be adequately assessed due to atrial fibrillation.  M-mode / 2D Measurements & Calculations:   LVIDd:4.88 cm(3.7 - 5.6 cm)     Diastolic TNHTHR:124.85 ml  LVIDs:4.51 cm(2.2 - 4.0 cm)     Systolic NWZUD.09 ml  IVSd:1.31 cm(0.6 - 1.1 cm)      Aortic Root:2.91 cm(2.0 - 3.7 cm)  LVPWd:1.33 cm(0.6 - 1.1 cm)     LA Dimension: 5.06 cm(1.9 - 4.0 cm)  Fractional Shortenin.58 %    LA volume/Index: 79 ml /49m^2  Calculated LVEF (%): 21.16 %    AV Cusp Separation: 0.91 cm   Mitral:                               Aortic   Peak E-Wave: 0.86 m/s                 Peak Velocity: 1.66 m/s                                        Mean Velocity: 1.18 m/s  Peak Gradient: 2.93 mmHg              Peak Gradient: 11.05 mmHg                                        Mean Gradient: 6.09 mmHg                                         Acceleration Time: 58.01 msec                                         AV VTI: 24.96 cm   Tricuspid:                            Pulmonic:   Estimated RVSP: 51.97 mmHg  Peak TR Velocity: 3.04 m/s  Peak TR Gradient: 36.9664 mmHg  Estimated RA Pressure: 15 mmHg                                        Estimated PASP: 51.97 mmHg      XR CHEST PORTABLE    Result Date: 5/5/2022  EXAMINATION: ONE XRAY VIEW OF THE CHEST 5/5/2022 10:01 am COMPARISON: None. HISTORY: ORDERING SYSTEM PROVIDED HISTORY: Respiratory distress TECHNOLOGIST PROVIDED HISTORY: Respiratory distress FINDINGS: There is pulmonary venous congestion with bilateral interstitial airspace opacities which appear slightly progressed compared to the previous exam 04/16/2022. No pneumothorax. Stable mild cardiomegaly Mediastinal and hilar contours are within normal limits. Bony thorax no acute abnormality. Probable CHF       Assessment and Plan:  Patient Active Problem List    Diagnosis Date Noted    Acute respiratory failure ([facility] [address] 05/05/2022    Acute on chronic systolic CHF (congestive heart failure), NYHA class 4 ([facility] [address] 05/05/2022    NSTEMI (non-ST elevated myocardial infarction) ([facility] [address] 05/05/2022    Left ventricular hypokinesis-severe 05/05/2022    Acute kidney injury superimposed on CKD ([facility] [address] 05/05/2022    Dehydration 05/05/2022    Respiratory acidosis 05/05/2022    CAD (coronary artery disease) 03/02/2022    History of stroke 03/02/2022    Hyperlipidemia 03/02/2022    Hypertension 03/02/2022    Hypothyroidism 03/02/2022    Atrial fibrillation ([facility] [address]         Discharge Medications:         Medication List      START taking these medications    LORazepam 2 MG/ML concentrated solution  Commonly known as: LORazepam intensol  Take 0.25 mLs by mouth every 8 hours as needed (restlessness) for up to 40 days. morphine sulfate 20 MG/ML concentrated oral solution  Take 0.5 mLs by mouth every 2 hours as needed for Pain for up to 5 days.      ondansetron 4 MG disintegrating tablet  Commonly known as: ZOFRAN-ODT  Take 1 tablet by mouth every 8 hours as needed for Nausea or Vomiting        CHANGE how you take these medications    acetaminophen 500 MG tablet  Commonly known as: TYLENOL  What changed: Another medication with the same name was removed. Continue taking this medication, and follow the directions you see here.      famotidine 40 MG tablet  Commonly known as: PEPCID  Take 0.5 tablets by mouth every other day  What changed: when to take this        CONTINUE taking these medications    albuterol sulfate  (90 Base) MCG/ACT inhaler     diclofenac sodium 1 % Gel  Commonly known as: VOLTAREN     docusate sodium 100 MG capsule  Commonly known as: COLACE     donepezil 5 MG tablet  Commonly known as: ARICEPT     Flonase 50 MCG/ACT nasal spray  Generic drug: fluticasone     furosemide 20 MG tablet  Commonly known as: Lasix  Take 2 tablets by mouth daily for 5 days     levothyroxine 112 MCG tablet  Commonly known as: SYNTHROID     melatonin 3 MG Tabs tablet     metoprolol succinate 100 MG extended release tablet  Commonly known as: TOPROL XL     nitroGLYCERIN 0.4 MG SL tablet  Commonly known as: NITROSTAT     omeprazole 20 MG delayed release capsule  Commonly known as: PRILOSEC     polyethylene glycol 17 g packet  Commonly known as: GLYCOLAX     Refresh 1.4-0.6 % Soln  Generic drug: Polyvinyl Alcohol-Povidone PF     sertraline 25 MG tablet  Commonly known as: ZOLOFT        STOP taking these medications    amLODIPine 5 MG tablet  Commonly known as: NORVASC     aspirin 325 MG tablet     calcium carbonate 500 MG Tabs tablet  Commonly known as: OSCAL     Eliquis 2.5 MG Tabs tablet  Generic drug: apixaban     ICaps Caps     LACTOBACILLUS RHAMNOSUS (GG) PO     losartan 50 MG tablet  Commonly known as: COZAAR     montelukast 10 MG tablet  Commonly known as: SINGULAIR           Where to Get Your Medications      You can get these medications from any pharmacy    Bring a paper prescription for each of these medications  · LORazepam 2 MG/ML concentrated solution  · morphine sulfate 20 MG/ML concentrated oral solution     Information about where to get these medications is not yet available    Ask your nurse or doctor about these medications  · famotidine 40 MG tablet  · furosemide 20 MG tablet  · ondansetron 4 MG disintegrating tablet         Patient Instructions:    Activity: activity as tolerated  Diet: cardiac diet  Wound Care: none needed  Other: None     Disposition:   DC to inpatient hospice    Follow up:  Patient will be followed by Vince Strong MD in 1-2 weeks    CORE MEASURES on Discharge (if applicable)  ACE/ARB in CHF: Yes  Statin in MI: Yes  ASA in MI: Yes  Statin in CVA: NA  Antiplatelet in CVA: NA    Total time spent on discharge services: 45 minutes    Including the following activities:  Evaluation and Management of patient  Discussion with patient and/or surrogate about current care plan  Coordination with Case Management and/or   Coordination of care with Consultants (if applicable)   Coordination of care with Receiving Facility Physician (if applicable)  Completion of DME forms (if applicable)  Preparation of Discharge Summary  Preparation of Medication Reconciliation  Preparation of Discharge Prescriptions    Signed:  SHELLIE Wilkerson CNP, SHELLIE, NP-C  5/6/2022, 2:27 PM

## 2022-05-06 NOTE — PROGRESS NOTES
Spoke with the sons and they have decided on hospice care back at the [facility]. The [facility] is able to accept her back and she will have [facility] hospice care. Patient will go by ambulance.     KALIE Giron

## 2022-05-06 NOTE — CONSULTS
[facility]          Department of Pharmacy   Pharmacy Renal Adjustment Note    Rita Mensah is a 80 y.o. female. Pharmacist assessment of renally cleared medications. Recent Labs     05/05/22  0950 05/06/22  0515   CREATININE 1.97* 2.66*     Estimated Creatinine Clearance: 11 mL/min (A) (based on SCr of 2.66 mg/dL (H)). Height:   Ht Readings from Last 1 Encounters:   05/06/22 5' 2\" (1.575 m)     Weight:  Wt Readings from Last 1 Encounters:   05/06/22 134 lb 0.6 oz (60.8 kg)       The following medication(s) have been adjusted based upon renal function:             Pepcid decreased to 20 mg po every other days for CrCl < 30 mL/min. Gildardo Ballesteros. Daphnie Demarco [facility],5/6/2022,8:41 AM

## 2022-05-06 NOTE — PLAN OF CARE
Problem: Discharge Planning  Goal: Discharge to home or other facility with appropriate resources  Outcome: Progressing  Flowsheets (Taken 5/6/2022 1348)  Discharge to home or other facility with appropriate resources: Refer to discharge planning if patient needs post-hospital services based on physician order or complex needs related to functional status, cognitive ability or social support system     Problem: Safety - Adult  Goal: Free from fall injury  Outcome: Progressing  Flowsheets (Taken 5/6/2022 1348)  Free From Fall Injury: Instruct family/caregiver on patient safety     Problem: ABCDS Injury Assessment  Goal: Absence of physical injury  Outcome: Progressing  Flowsheets (Taken 5/5/2022 2144 by Tarik Blank, RN)  Absence of Physical Injury: Implement safety measures based on patient assessment     Problem: Pain  Goal: Verbalizes/displays adequate comfort level or baseline comfort level  Outcome: Progressing  Flowsheets (Taken 5/6/2022 1348)  Verbalizes/displays adequate comfort level or baseline comfort level:   Encourage patient to monitor pain and request assistance   Assess pain using appropriate pain scale   Administer analgesics based on type and severity of pain and evaluate response     Problem: Nutrition Deficit:  Goal: Optimize nutritional status  5/6/2022 1348 by Bertha Dc RN  Outcome: Progressing  Flowsheets (Taken 5/6/2022 1024 by Kristy Gipson)  Nutrient intake appropriate for improving, restoring, or maintaining nutritional needs: Monitor oral intake, labs, and treatment plans  5/6/2022 1024 by [address] [place] [address] (Taken 5/6/2022 1024)  Nutrient intake appropriate for improving, restoring, or maintaining nutritional needs: Monitor oral intake, labs, and treatment plans  Note: Monitor PO and weight change     Problem: Chronic Conditions and Co-morbidities  Goal: Patient's chronic conditions and co-morbidity symptoms are monitored and maintained or improved  Outcome: Progressing  Flowsheets (Taken 5/6/2022 6189)  Care Plan - Patient's Chronic Conditions and Co-Morbidity Symptoms are Monitored and Maintained or Improved:   Monitor and assess patient's chronic conditions and comorbid symptoms for stability, deterioration, or improvement   Collaborate with multidisciplinary team to address chronic and comorbid conditions and prevent exacerbation or deterioration

## 2022-05-07 VITALS
HEART RATE: 114 BPM | TEMPERATURE: 98.4 F | OXYGEN SATURATION: 96 % | HEIGHT: 62 IN | BODY MASS INDEX: 25.96 KG/M2 | WEIGHT: 141.09 LBS | RESPIRATION RATE: 23 BRPM | SYSTOLIC BLOOD PRESSURE: 119 MMHG | DIASTOLIC BLOOD PRESSURE: 74 MMHG

## 2022-05-07 LAB
ABSOLUTE EOS #: 0.1 K/UL (ref 0–0.44)
ABSOLUTE IMMATURE GRANULOCYTE: 0.04 K/UL (ref 0–0.3)
ABSOLUTE LYMPH #: 1.46 K/UL (ref 1.1–3.7)
ABSOLUTE MONO #: 0.91 K/UL (ref 0.1–1.2)
ANION GAP SERPL CALCULATED.3IONS-SCNC: 14 MMOL/L (ref 9–17)
BASOPHILS # BLD: 0 % (ref 0–2)
BASOPHILS ABSOLUTE: <0.03 K/UL (ref 0–0.2)
BUN BLDV-MCNC: 43 MG/DL (ref 8–23)
BUN/CREAT BLD: 16 (ref 9–20)
CALCIUM SERPL-MCNC: 7.6 MG/DL (ref 8.6–10.4)
CHLORIDE BLD-SCNC: 100 MMOL/L (ref 98–107)
CO2: 21 MMOL/L (ref 20–31)
CREAT SERPL-MCNC: 2.69 MG/DL (ref 0.5–0.9)
EOSINOPHILS RELATIVE PERCENT: 1 % (ref 1–4)
GFR AFRICAN AMERICAN: 20 ML/MIN
GFR NON-AFRICAN AMERICAN: 17 ML/MIN
GFR SERPL CREATININE-BSD FRML MDRD: ABNORMAL ML/MIN/{1.73_M2}
GFR SERPL CREATININE-BSD FRML MDRD: ABNORMAL ML/MIN/{1.73_M2}
GLUCOSE BLD-MCNC: 100 MG/DL (ref 70–99)
HCT VFR BLD CALC: 33 % (ref 36.3–47.1)
HEMOGLOBIN: 10.6 G/DL (ref 11.9–15.1)
IMMATURE GRANULOCYTES: 0 %
LYMPHOCYTES # BLD: 14 % (ref 24–43)
MAGNESIUM: 2.4 MG/DL (ref 1.6–2.6)
MCH RBC QN AUTO: 30.5 PG (ref 25.2–33.5)
MCHC RBC AUTO-ENTMCNC: 32.1 G/DL (ref 28.4–34.8)
MCV RBC AUTO: 95.1 FL (ref 82.6–102.9)
MONOCYTES # BLD: 8 % (ref 3–12)
NRBC AUTOMATED: 0 PER 100 WBC
PDW BLD-RTO: 17 % (ref 11.8–14.4)
PLATELET # BLD: 156 K/UL (ref 138–453)
PMV BLD AUTO: 11.8 FL (ref 8.1–13.5)
POTASSIUM SERPL-SCNC: 3.6 MMOL/L (ref 3.7–5.3)
RBC # BLD: 3.47 M/UL (ref 3.95–5.11)
SEG NEUTROPHILS: 77 % (ref 36–65)
SEGMENTED NEUTROPHILS ABSOLUTE COUNT: 8.28 K/UL (ref 1.5–8.1)
SODIUM BLD-SCNC: 135 MMOL/L (ref 135–144)
WBC # BLD: 10.8 K/UL (ref 3.5–11.3)

## 2022-05-07 PROCEDURE — 6360000002 HC RX W HCPCS: Performed by: NURSE PRACTITIONER

## 2022-05-07 PROCEDURE — 83735 ASSAY OF MAGNESIUM: CPT

## 2022-05-07 PROCEDURE — 94640 AIRWAY INHALATION TREATMENT: CPT

## 2022-05-07 PROCEDURE — 6370000000 HC RX 637 (ALT 250 FOR IP): Performed by: FAMILY MEDICINE

## 2022-05-07 PROCEDURE — 80048 BASIC METABOLIC PNL TOTAL CA: CPT

## 2022-05-07 PROCEDURE — 94761 N-INVAS EAR/PLS OXIMETRY MLT: CPT

## 2022-05-07 PROCEDURE — 85025 COMPLETE CBC W/AUTO DIFF WBC: CPT

## 2022-05-07 PROCEDURE — 2580000003 HC RX 258: Performed by: INTERNAL MEDICINE

## 2022-05-07 PROCEDURE — 6370000000 HC RX 637 (ALT 250 FOR IP): Performed by: INTERNAL MEDICINE

## 2022-05-07 PROCEDURE — 2700000000 HC OXYGEN THERAPY PER DAY

## 2022-05-07 PROCEDURE — 36415 COLL VENOUS BLD VENIPUNCTURE: CPT

## 2022-05-07 RX ADMIN — MORPHINE SULFATE 2 MG: 2 INJECTION, SOLUTION INTRAMUSCULAR; INTRAVENOUS at 07:00

## 2022-05-07 RX ADMIN — SODIUM CHLORIDE, PRESERVATIVE FREE 10 ML: 5 INJECTION INTRAVENOUS at 08:13

## 2022-05-07 RX ADMIN — APIXABAN 2.5 MG: 2.5 TABLET, FILM COATED ORAL at 08:03

## 2022-05-07 RX ADMIN — SERTRALINE HYDROCHLORIDE 25 MG: 25 TABLET ORAL at 08:03

## 2022-05-07 RX ADMIN — ALBUTEROL SULFATE 2 PUFF: 90 AEROSOL, METERED RESPIRATORY (INHALATION) at 05:13

## 2022-05-07 RX ADMIN — ASPIRIN 81 MG: 81 TABLET, CHEWABLE ORAL at 08:03

## 2022-05-07 RX ADMIN — CLOPIDOGREL BISULFATE 75 MG: 75 TABLET ORAL at 08:03

## 2022-05-07 RX ADMIN — FLUTICASONE PROPIONATE 1 SPRAY: 50 SPRAY, METERED NASAL at 08:13

## 2022-05-07 RX ADMIN — LEVOTHYROXINE SODIUM 100 MCG: 100 TABLET ORAL at 07:00

## 2022-05-07 RX ADMIN — PANTOPRAZOLE SODIUM 40 MG: 40 TABLET, DELAYED RELEASE ORAL at 07:00

## 2022-05-07 ASSESSMENT — PAIN SCALES - GENERAL
PAINLEVEL_OUTOF10: 5
PAINLEVEL_OUTOF10: 1

## 2022-05-07 NOTE — PROGRESS NOTES
Pt laying in bed awake. Vitals and assessment as charted. Pt is diaphoretic and having labored breathing. Pt also stated she is having back pain. Morphine 2mg IV given. Pt denies any further needs at this time. Call light within reach. Bed alarm on.

## 2022-05-07 NOTE — PROGRESS NOTES
Vitals and assessment as charted. Pt denies pain. Family remains at bedside. Call light within reach. Bed alarm on.

## 2022-05-07 NOTE — PROGRESS NOTES
Pt laying in bed, A&Ox4 son at bedside, assessment complete, see flow sheet for documentation, respirations even unlabored on 3lpm, states is having some pain but states it is from fibromyalgia, denies any pain medications at this time, updated on transport time, all needs met, call light within reach, will continue to monitor.

## 2022-05-07 NOTE — PROGRESS NOTES
Pt states she is still having back pain, 5/10, prn morphine given at this time, Nitro turned off at this time. Will continue to monitor.

## 2022-05-07 NOTE — PLAN OF CARE
Problem: Safety - Adult  Goal: Free from fall injury  Outcome: Progressing  Note: Pt will remain free from injury. Wheels locked on bed and call light within reach. Will continue to monitor. Problem: Pain  Goal: Verbalizes/displays adequate comfort level or baseline comfort level  Outcome: Progressing  Note: Pt is able to verbalize when in pain. Pt given Morphine as needed. Will continue to monitor.

## 2022-05-07 NOTE — PROGRESS NOTES
[facility]  Inpatient/Observation/Outpatient Rehabilitation    Date: 2022  Patient Name: Saba Sol       [x] Inpatient Acute/Observation       []  Outpatient  : 9/3/1928       [] Pt no showed for scheduled appointment    [] Pt refused/declined therapy at this time due to:           [x] Pt cancelled due to:  [] No Reason Given   [] Sick/ill   [] Other: Patient leaving this morning to go on hospice. Per RN, hold PT.        Eber Francis, PT, DPT Date: 2022

## 2022-06-04 PROBLEM — E86.0 DEHYDRATION: Status: RESOLVED | Noted: 2022-05-05 | Resolved: 2022-06-04
